# Patient Record
Sex: FEMALE | Race: WHITE | NOT HISPANIC OR LATINO | Employment: OTHER | URBAN - METROPOLITAN AREA
[De-identification: names, ages, dates, MRNs, and addresses within clinical notes are randomized per-mention and may not be internally consistent; named-entity substitution may affect disease eponyms.]

---

## 2017-09-01 ENCOUNTER — TELEPHONE (OUTPATIENT)
Dept: MEDICAL GROUP | Age: 67
End: 2017-09-01

## 2017-09-01 NOTE — TELEPHONE ENCOUNTER
ESTABLISHED PATIENT PRE-VISIT PLANNING     Note: Patient will not be contacted if there is no indication to call.     1.  Reviewed notes from the last few office visits within the medical group: Yes    2.  If any orders were placed at last visit or intended to be done for this visit (i.e. 6 mos follow-up), do we have Results/Consult Notes?        •  Labs - Labs were not ordered at last office visit.       •  Imaging - Imaging was not ordered at last office visit.       •  Referrals - No referrals were ordered at last office visit.    3. Is this appointment scheduled as a Hospital Follow-Up? No    4.  Immunizations were updated in [facility] using WebIZ?: Yes       •  Web Iz Recommendations: FLU, HEPATITIS A , HEPATITIS B, PREVNAR (PCV13)  and TDAP    5.  Patient is due for the following Health Maintenance Topics:   Health Maintenance Due   Topic Date Due   • IMM DTaP/Tdap/Td Vaccine (1 - Tdap) 08/27/1971   • PAP SMEAR  08/27/1973   • COLONOSCOPY  08/27/2002   • IMM ZOSTER VACCINE  08/27/2012   • MAMMOGRAM  08/13/2016   • IMM PNEUMOCOCCAL 65+ (ADULT) LOW/MEDIUM RISK SERIES (1 of 2 - PCV13) 08/27/2017   • IMM INFLUENZA (1) 09/01/2017       - Patient has completed ZOSTAVAX (Shingles) Immunization(s) per WebIZ. Chart has been updated.      6.  Patient was NOT informed to arrive 15 min prior to their scheduled appointment and bring in their medication bottles.

## 2017-09-04 PROBLEM — E03.4 HYPOTHYROIDISM DUE TO ACQUIRED ATROPHY OF THYROID: Status: ACTIVE | Noted: 2017-09-04

## 2017-09-04 RX ORDER — THYROID 60 MG
30 TABLET ORAL
Refills: 1 | COMMUNITY
Start: 2017-08-07 | End: 2017-11-27

## 2017-09-05 ENCOUNTER — OFFICE VISIT (OUTPATIENT)
Dept: MEDICAL GROUP | Age: 67
End: 2017-09-05
Payer: COMMERCIAL

## 2017-09-05 VITALS
WEIGHT: 126.6 LBS | TEMPERATURE: 97.9 F | DIASTOLIC BLOOD PRESSURE: 68 MMHG | SYSTOLIC BLOOD PRESSURE: 102 MMHG | HEIGHT: 64 IN | BODY MASS INDEX: 21.61 KG/M2 | HEART RATE: 75 BPM | OXYGEN SATURATION: 98 %

## 2017-09-05 DIAGNOSIS — Z12.31 VISIT FOR SCREENING MAMMOGRAM: ICD-10-CM

## 2017-09-05 DIAGNOSIS — E55.9 VITAMIN D INSUFFICIENCY: ICD-10-CM

## 2017-09-05 DIAGNOSIS — R73.01 IFG (IMPAIRED FASTING GLUCOSE): ICD-10-CM

## 2017-09-05 DIAGNOSIS — Z12.11 SCREENING FOR COLON CANCER: ICD-10-CM

## 2017-09-05 DIAGNOSIS — E78.5 DYSLIPIDEMIA: Chronic | ICD-10-CM

## 2017-09-05 DIAGNOSIS — E03.4 HYPOTHYROIDISM DUE TO ACQUIRED ATROPHY OF THYROID: ICD-10-CM

## 2017-09-05 DIAGNOSIS — F51.01 PRIMARY INSOMNIA: ICD-10-CM

## 2017-09-05 PROCEDURE — 99204 OFFICE O/P NEW MOD 45 MIN: CPT | Performed by: INTERNAL MEDICINE

## 2017-09-05 RX ORDER — TRAZODONE HYDROCHLORIDE 50 MG/1
25 TABLET ORAL
Qty: 90 TAB | Refills: 3 | Status: SHIPPED | OUTPATIENT
Start: 2017-09-05 | End: 2019-02-28

## 2017-09-05 RX ORDER — LORAZEPAM 1 MG/1
0.5 TABLET ORAL EVERY 4 HOURS PRN
COMMUNITY
End: 2017-09-05

## 2017-09-05 RX ORDER — LEVOTHYROXINE SODIUM 0.05 MG/1
50 TABLET ORAL
Qty: 90 TAB | Refills: 3 | Status: SHIPPED | OUTPATIENT
Start: 2017-09-05 | End: 2018-06-04 | Stop reason: SDUPTHER

## 2017-09-05 ASSESSMENT — PATIENT HEALTH QUESTIONNAIRE - PHQ9: CLINICAL INTERPRETATION OF PHQ2 SCORE: 0

## 2017-09-05 ASSESSMENT — PAIN SCALES - GENERAL: PAINLEVEL: NO PAIN

## 2017-09-05 NOTE — LETTER
[employer]  Penny Jensen M.D.  [address]  [place] [address]  Fax: [phone]   Authorization for Release/Disclosure of   Protected Health Information   Name: ЕЛЕНА MALIK : 1950 SSN: [id number]   Address: [address] [place] [address] Phone:    [phone] (home)    I authorize the entity listed below to release/disclose the PHI below to:   [facility]/Penny Jensen M.D. and Penny Jensen M.D.   Provider or Entity Name:  [facility] Restrepo[facility]   Address   City, State, [facility]   Phone:      Fax:     Reason for request: continuity of care   Information to be released:    [  ] LAST COLONOSCOPY,  including any PATH REPORT and follow-up  [  ] LAST FIT/COLOGUARD RESULT [  ] LAST DEXA  [  ] LAST MAMMOGRAM  [ xxxx ] LAST PAP  [  ] LAST LABS [  ] RETINA EXAM REPORT  [  ] IMMUNIZATION RECORDS  [  ] Release all info      [  ] Check here and initial the line next to each item to release ALL health information INCLUDING  _____ Care and treatment for drug and / or alcohol abuse  _____ HIV testing, infection status, or AIDS  _____ Genetic Testing    DATES OF SERVICE OR TIME PERIOD TO BE DISCLOSED: _____________  I understand and acknowledge that:  * This Authorization may be revoked at any time by you in writing, except if your health information has already been used or disclosed.  * Your health information that will be used or disclosed as a result of you signing this authorization could be re-disclosed by the recipient. If this occurs, your re-disclosed health information may no longer be protected by State or Federal laws.  * You may refuse to sign this Authorization. Your refusal will not affect your ability to obtain treatment.  * This Authorization becomes effective upon signing and will  on (date) __________.      If no date is indicated, this Authorization will  one (1) year from the signature date.    Name: Елена Malik    Signature:   Date:     2017       PLEASE  FAX REQUESTED RECORDS BACK TO: [phone]

## 2017-09-06 NOTE — ASSESSMENT & PLAN NOTE
Patient reported that she has significant family history of diabetes. She never has diagnosed with diabetes but she has borderline high sugar in the past. She will try to control her sugar with diet and exercise. She requested to check her A1c.

## 2017-09-06 NOTE — ASSESSMENT & PLAN NOTE
Patient stated that she has difficult to fall asleep sometimes. She was treated with Ativan as needed for sleep. She only took Ativan once a week or twice a week the most. We discussed the risks of chronic use of Ativan and discussed to switch to trazodone. Patient stated that she has not taken Ativan for 4-6 week and she does not have any withdrawal symptoms. She agreed to try trazodone.

## 2017-09-06 NOTE — ASSESSMENT & PLAN NOTE
Patient has mixed hyperlipidemia. She has never treated with medication. She tries to control her cholesterol with diet and exercise.

## 2017-09-06 NOTE — ASSESSMENT & PLAN NOTE
Patient reported that she has low vitamin D in the past and was treated with vitamin D prescription. She requests to retest vitamin D level.

## 2017-09-06 NOTE — PROGRESS NOTES
Елена San is a 67 y.o. female here to establish care and the evaluation and management of:      HPI:    Vitamin D insufficiency  Patient reported that she has low vitamin D in the past and was treated with vitamin D prescription. She requests to retest vitamin D level.    IFG (impaired fasting glucose)  Patient reported that she has significant family history of diabetes. She never has diagnosed with diabetes but she has borderline high sugar in the past. She will try to control her sugar with diet and exercise. She requested to check her A1c.    Hypothyroidism due to acquired atrophy of thyroid  Patient stated that she was diagnosed with hypothyroid from the blood test. She is taking Huxley thyroid 60 mg daily. The dose of armour thyroid was increased from 30 mg to 60 mg as she has high TSH as 7. After increasing the dose, TSH decreased to 3.5. She denied side effects from taking armour thyroid. Given her age and the risk of taking armour thyroid hormone supplement, we discussed to switch amour thyroid to levothyroxine. Patient agreed with plan.    Dyslipidemia  Patient has mixed hyperlipidemia. She has never treated with medication. She tries to control her cholesterol with diet and exercise.    Primary insomnia  Patient stated that she has difficult to fall asleep sometimes. She was treated with Ativan as needed for sleep. She only took Ativan once a week or twice a week the most. We discussed the risks of chronic use of Ativan and discussed to switch to trazodone. Patient stated that she has not taken Ativan for 4-6 week and she does not have any withdrawal symptoms. She agreed to try trazodone.    Current medicines (including changes today)  Current Outpatient Prescriptions   Medication Sig Dispense Refill   • trazodone (DESYREL) 50 MG Tab Take 0.5 Tabs by mouth at bedtime as needed for Sleep. 90 Tab 3   • levothyroxine (SYNTHROID) 50 MCG Tab Take 1 Tab by mouth Every morning on an empty stomach. 90 Tab 3   •  AKOSUA THYROID 60 MG Tab Take 30 mg by mouth every day.  1     No current facility-administered medications for this visit.      She  has a past medical history of Cardiac murmur (2009); Dyslipidemia (2009); Neoplasm of unspecified nature of bone, soft tissue, and skin (2009); Overactive bladder (2009); Skin lesion (2009); and Thyroid disease.  She  has a past surgical history that includes imary c section.  Social History   Substance Use Topics   • Smoking status: Former Smoker     Packs/day: 1.00     Quit date: 1972   • Smokeless tobacco: Never Used      Comment: only smoked 3 years   • Alcohol use 1.8 oz/week     3 Glasses of wine per week     Social History     Social History Narrative   • No narrative on file     Family History   Problem Relation Age of Onset   • Hypertension Mother    • Diabetes Mother    • Hyperlipidemia Mother    • Cancer Father      throat cancer   • Diabetes Brother    • Hypertension Brother    • Hyperlipidemia Brother    • Diabetes Brother    • Hypertension Brother    • Hyperlipidemia Brother      Family Status   Relation Status   • Mother Alive   • Father    • Brother Alive   • Brother Alive     Health Maintenance Topics with due status: Overdue       Topic Date Due    IMM DTaP/Tdap/Td Vaccine 1971    PAP SMEAR 1973    COLONOSCOPY 2002    MAMMOGRAM 2016    IMM PNEUMOCOCCAL 65+ (ADULT) LOW/MEDIUM RISK SERIES 2017    IMM INFLUENZA 2017         ROS    Gen.: Denied weight change, appetite change, fatigue.  ENT: Denied sinus tenderness, nasal congestion, runny nose, or sore throat  CVS: Denied chest pain, palpitations, legs swelling.  Respiratory: Denied cough, shortness of breath, wheezing.  GI: Denied abdominal pain, constipation or diarrhea.  Endocrine: Denied temperature intolerance, increased frequency of urination, polyphagia or polydipsia.  Musculoskeletal: Denied back pain or joint pain.    All other systems  "reviewed and are negative     Objective:     Blood pressure 102/68, pulse 75, temperature 36.6 °C (97.9 °F), height 1.613 m (5' 3.5\"), weight 57.4 kg (126 lb 9.6 oz), SpO2 98 %, not currently breastfeeding. Body mass index is 22.07 kg/m².  Physical Exam:    Constitutional: Well nourished and Well developed, Alert, no distress.  Skin: Warm, dry, good turgor, no rashes in visible areas.  Eye: Equal, round and reactive, conjunctiva clear, lids normal.  ENMT: Lips without lesions, good dentition, oropharynx clear.  Neck: Trachea midline, no masses, no thyromegaly. No cervical or supraclavicular lymphadenopathy.  Respiratory: Unlabored respiratory effort, lungs clear to auscultation, no wheezes, no ronchi.  Cardiovascular: Normal S1, S2, no murmur, no edema.   Abdomen: Soft, non distended, non-tender, no masses, no hepatosplenomegaly. Bowel sound normal.  Extremities: No edema, no clubbing, no cyanosis.  Psych: Alert and oriented x3, normal affect and mood.        Assessment and Plan:   The following treatment plan was discussed       1. Hypothyroidism due to acquired atrophy of thyroid  - Discussed to switch amour thyroid to levothyroxine. Patient will cut down amour thyroid from 60 mg daily to 30 mg daily for one week and then cut down 30 mg every other day for one week and then cut down 30 mg twice a week and then stop.  - She was started taking levothyroxine 50 µg every morning on an empty stomach.  - Recheck lab in 6-8 week period  - CBC WITH DIFFERENTIAL; Future  - TSH; Future  - FREE THYROXINE; Future  - levothyroxine (SYNTHROID) 50 MCG Tab; Take 1 Tab by mouth Every morning on an empty stomach.  Dispense: 90 Tab; Refill: 3    2. Dyslipidemia  - Not on medication. Continue to control with diet and exercise.  - Advised to eat low fat, low carbohydrate and high fiber diet as well as do cardio physical exercise regularly.   - COMP METABOLIC PANEL; Future  - LIPID PROFILE; Future    3. Vitamin D insufficiency  - " Advised to take vitamin D 2000 units daily. Recheck lab in 8 weeks.  - VITAMIN D,25 HYDROXY; Future    4. IFG (impaired fasting glucose)  - Advised to eat low fat, low carbohydrate and high fiber diet as well as do cardio physical exercise regularly.   - COMP METABOLIC PANEL; Future  - HEMOGLOBIN A1C; Future    5. Primary insomnia  - Discussed sleep hygiene. Go to bed and wake up at consistent times whether work/school day or not. Keep room dark, quiet, and comfortable. Don't nap in late afternoon and don't nap more than an hour. Increase exposure to sunlight during awake times and avoid bright lights before going to sleep. Avoid stimulant or caffeine use more than 4 hours after wake time.   - She will try trazodone 25 mg daily at bedtime when necessary. Reviewed the potential side effect of trazodone with patient.  - trazodone (DESYREL) 50 MG Tab; Take 0.5 Tabs by mouth at bedtime as needed for Sleep.  Dispense: 90 Tab; Refill: 3    6. Visit for screening mammogram  - Order screening mammogram.  - MA-SCREEN MAMMO W/CAD-BILAT; Future    7. Screening for colon cancer  - Patient has colonoscopy with [facility] 10 years ago. She is to repeat colonoscopy. She requests to refer her to [facility].  - REFERRAL TO GASTROENTEROLOGY    8. Health maintenance  - Patient has appointment with gynecologist, Dr. Gant for pelvic exam and Pap smear. She wants to postpone flu vaccine, pneumonia vaccine and tetanus vaccine to next few weeks.    Records requested.  Followup: Return in about 10 weeks (around 11/14/2017), or if symptoms worsen or fail to improve, for hypothyroid, hyperlipidemia, vitamin D insufficiency, insomnia, lab review. sooner should new symptoms or problems arise.      Please note that this dictation was created using voice recognition software. I have made every reasonable attempt to correct obvious errors, but I expect that there may have unintended errors in text, spelling, punctuation, or grammar  that I did not discover.

## 2017-09-15 ENCOUNTER — APPOINTMENT (OUTPATIENT)
Dept: MEDICAL GROUP | Age: 67
End: 2017-09-15
Payer: COMMERCIAL

## 2017-09-28 ENCOUNTER — HOSPITAL ENCOUNTER (OUTPATIENT)
Dept: RADIOLOGY | Facility: MEDICAL CENTER | Age: 67
End: 2017-09-28
Attending: INTERNAL MEDICINE
Payer: COMMERCIAL

## 2017-09-28 DIAGNOSIS — Z12.31 VISIT FOR SCREENING MAMMOGRAM: ICD-10-CM

## 2017-09-28 PROCEDURE — G0202 SCR MAMMO BI INCL CAD: HCPCS

## 2017-11-15 LAB
25(OH)D3+25(OH)D2 SERPL-MCNC: 60.7 NG/ML (ref 30–100)
ALBUMIN SERPL-MCNC: 4.5 G/DL (ref 3.6–4.8)
ALBUMIN/GLOB SERPL: 1.7 {RATIO} (ref 1.2–2.2)
ALP SERPL-CCNC: 44 IU/L (ref 39–117)
ALT SERPL-CCNC: 17 IU/L (ref 0–32)
AST SERPL-CCNC: 21 IU/L (ref 0–40)
BASOPHILS # BLD AUTO: 0 X10E3/UL (ref 0–0.2)
BASOPHILS NFR BLD AUTO: 1 %
BILIRUB SERPL-MCNC: 0.6 MG/DL (ref 0–1.2)
BUN SERPL-MCNC: 19 MG/DL (ref 8–27)
BUN/CREAT SERPL: 21 (ref 12–28)
CALCIUM SERPL-MCNC: 9.5 MG/DL (ref 8.7–10.3)
CHLORIDE SERPL-SCNC: 98 MMOL/L (ref 96–106)
CHOLEST SERPL-MCNC: 212 MG/DL (ref 100–199)
CO2 SERPL-SCNC: 25 MMOL/L (ref 18–29)
COMMENT 011824: ABNORMAL
CREAT SERPL-MCNC: 0.91 MG/DL (ref 0.57–1)
EOSINOPHIL # BLD AUTO: 0.3 X10E3/UL (ref 0–0.4)
EOSINOPHIL NFR BLD AUTO: 7 %
ERYTHROCYTE [DISTWIDTH] IN BLOOD BY AUTOMATED COUNT: 12.9 % (ref 12.3–15.4)
GFR SERPLBLD CREATININE-BSD FMLA CKD-EPI: 66 ML/MIN/1.73
GFR SERPLBLD CREATININE-BSD FMLA CKD-EPI: 77 ML/MIN/1.73
GLOBULIN SER CALC-MCNC: 2.6 G/DL (ref 1.5–4.5)
GLUCOSE SERPL-MCNC: 89 MG/DL (ref 65–99)
HBA1C MFR BLD: 5.2 % (ref 4.8–5.6)
HCT VFR BLD AUTO: 45.6 % (ref 34–46.6)
HDLC SERPL-MCNC: 77 MG/DL
HGB BLD-MCNC: 15.3 G/DL (ref 11.1–15.9)
IMM GRANULOCYTES # BLD: 0 X10E3/UL (ref 0–0.1)
IMM GRANULOCYTES NFR BLD: 0 %
IMMATURE CELLS  115398: NORMAL
LDLC SERPL CALC-MCNC: 120 MG/DL (ref 0–99)
LYMPHOCYTES # BLD AUTO: 1.7 X10E3/UL (ref 0.7–3.1)
LYMPHOCYTES NFR BLD AUTO: 39 %
MCH RBC QN AUTO: 32 PG (ref 26.6–33)
MCHC RBC AUTO-ENTMCNC: 33.6 G/DL (ref 31.5–35.7)
MCV RBC AUTO: 95 FL (ref 79–97)
MONOCYTES # BLD AUTO: 0.3 X10E3/UL (ref 0.1–0.9)
MONOCYTES NFR BLD AUTO: 8 %
MORPHOLOGY BLD-IMP: NORMAL
NEUTROPHILS # BLD AUTO: 2 X10E3/UL (ref 1.4–7)
NEUTROPHILS NFR BLD AUTO: 45 %
NRBC BLD AUTO-RTO: NORMAL %
PLATELET # BLD AUTO: 336 X10E3/UL (ref 150–379)
POTASSIUM SERPL-SCNC: 4.2 MMOL/L (ref 3.5–5.2)
PROT SERPL-MCNC: 7.1 G/DL (ref 6–8.5)
RBC # BLD AUTO: 4.78 X10E6/UL (ref 3.77–5.28)
SODIUM SERPL-SCNC: 140 MMOL/L (ref 134–144)
T4 FREE SERPL-MCNC: 1.28 NG/DL (ref 0.82–1.77)
TRIGL SERPL-MCNC: 74 MG/DL (ref 0–149)
TSH SERPL DL<=0.005 MIU/L-ACNC: 3.79 UIU/ML (ref 0.45–4.5)
VLDLC SERPL CALC-MCNC: 15 MG/DL (ref 5–40)
WBC # BLD AUTO: 4.3 X10E3/UL (ref 3.4–10.8)

## 2017-11-26 NOTE — ASSESSMENT & PLAN NOTE
Patient used to take ativan as needed for insomnia. We discussed not to take benzodiazepine chronically due to dependency and other potential side effects. She agrees to try trazodone in previous visit on 9/5/17. She is taking trazodone 25-50 mg qhs prn. She denies side effects from taking trazodone and she is doing well with trazodone.

## 2017-11-26 NOTE — ASSESSMENT & PLAN NOTE
Patient used to take Amour thyroid 60 mg daily. We discussed to wean down Amour thyroid and stopped it as well as advised her to start taking levothyroxine 50 mcg daily every morning with empty stomach in last visit on 9/5/17. Her thyroid function test was within normal with treatment of levothyroxine 50 µg every morning for 6 weeks. She already stopped taking Amour thyroid. We discussed to continue levothyroxine alone. She agreed with the plan.    Thyroid function tests are within normal.    Results for EMILI MALIK (MRN [id number]) as of 11/26/2017 15:02   Ref. Range 11/14/2017 08:42   TSH Latest Ref Range: 0.450 - 4.500 uIU/mL 3.790   Free T-4 Latest Ref Range: 0.82 - 1.77 ng/dL 1.28

## 2017-11-26 NOTE — ASSESSMENT & PLAN NOTE
Her vitamin D level was adequate currently. She reported viatmin D insufficiency in the past and was treated with supplement in the past. She is taking over-the-counter vitamin D 2000 units daily.    Results for EMILI MALIK (MRN [id number]) as of 11/26/2017 15:02   Ref. Range 6/11/2009 07:55 11/14/2017 08:42   25-Hydroxy   Vitamin D 25 Latest Ref Range: 30.0 - 100.0 ng/mL 30.5 (L) 60.7

## 2017-11-26 NOTE — ASSESSMENT & PLAN NOTE
Her fasting blood sugar and A1C improve with diet. A1C 5.2 on 11/14/17. She reported family hx of diabetes and personal history of borderline high fasting blood sugar.

## 2017-11-26 NOTE — ASSESSMENT & PLAN NOTE
Patient attempts to control her cholesterol with diet and exercise.   Cholesterol is not well controlled yet.  ASCVD score 3.3%  We discussed the risks and benefits of statin treatment. Patient still wants to control cholesterol with diet and physical exercise.    Results for EMILI MALIK (MRN [id number]) as of 11/26/2017 15:02   Ref. Range 6/11/2009 07:55 11/14/2017 08:42   Cholesterol,Tot Latest Ref Range: 100 - 199 mg/dL 212 (H) 212 (H)   Triglycerides Latest Ref Range: 0 - 149 mg/dL 166 (H) 74   HDL Latest Ref Range: >39 mg/dL 51 77   LDL Latest Ref Range: 0 - 99 mg/dL 128 (H) 120 (H)

## 2017-11-27 ENCOUNTER — OFFICE VISIT (OUTPATIENT)
Dept: MEDICAL GROUP | Age: 67
End: 2017-11-27
Payer: COMMERCIAL

## 2017-11-27 VITALS
DIASTOLIC BLOOD PRESSURE: 60 MMHG | TEMPERATURE: 97.5 F | HEIGHT: 64 IN | HEART RATE: 87 BPM | OXYGEN SATURATION: 97 % | WEIGHT: 128.4 LBS | BODY MASS INDEX: 21.92 KG/M2 | SYSTOLIC BLOOD PRESSURE: 104 MMHG

## 2017-11-27 DIAGNOSIS — E78.5 DYSLIPIDEMIA: Chronic | ICD-10-CM

## 2017-11-27 DIAGNOSIS — Z23 NEED FOR DIPHTHERIA-TETANUS-PERTUSSIS (TDAP) VACCINE: ICD-10-CM

## 2017-11-27 DIAGNOSIS — F51.01 PRIMARY INSOMNIA: ICD-10-CM

## 2017-11-27 DIAGNOSIS — E03.4 HYPOTHYROIDISM DUE TO ACQUIRED ATROPHY OF THYROID: ICD-10-CM

## 2017-11-27 DIAGNOSIS — R73.01 IFG (IMPAIRED FASTING GLUCOSE): ICD-10-CM

## 2017-11-27 DIAGNOSIS — E55.9 VITAMIN D INSUFFICIENCY: ICD-10-CM

## 2017-11-27 PROCEDURE — 90715 TDAP VACCINE 7 YRS/> IM: CPT | Performed by: INTERNAL MEDICINE

## 2017-11-27 PROCEDURE — 99215 OFFICE O/P EST HI 40 MIN: CPT | Mod: 25 | Performed by: INTERNAL MEDICINE

## 2017-11-27 PROCEDURE — 90471 IMMUNIZATION ADMIN: CPT | Performed by: INTERNAL MEDICINE

## 2017-11-27 RX ORDER — MULTIVIT-MIN/IRON/FOLIC ACID/K 18-600-40
CAPSULE ORAL
COMMUNITY

## 2017-11-27 ASSESSMENT — PAIN SCALES - GENERAL: PAINLEVEL: NO PAIN

## 2017-11-28 NOTE — PROGRESS NOTES
Subjective:   Елена Malik is a 67 y.o. female here today for evaluation and management of:      Dyslipidemia  Patient attempts to control her cholesterol with diet and exercise.   Cholesterol is not well controlled yet.  ASCVD score 3.3%  We discussed the risks and benefits of statin treatment. Patient still wants to control cholesterol with diet and physical exercise.    Results for ЕЛЕНА MALIK (MRN [id number]) as of 11/26/2017 15:02   Ref. Range 6/11/2009 07:55 11/14/2017 08:42   Cholesterol,Tot Latest Ref Range: 100 - 199 mg/dL 212 (H) 212 (H)   Triglycerides Latest Ref Range: 0 - 149 mg/dL 166 (H) 74   HDL Latest Ref Range: >39 mg/dL 51 77   LDL Latest Ref Range: 0 - 99 mg/dL 128 (H) 120 (H)       Hypothyroidism due to acquired atrophy of thyroid  Patient used to take Amour thyroid 60 mg daily. We discussed to wean down Amour thyroid and stopped it as well as advised her to start taking levothyroxine 50 mcg daily every morning with empty stomach in last visit on 9/5/17. Her thyroid function test was within normal with treatment of levothyroxine 50 µg every morning for 6 weeks. She already stopped taking Amour thyroid. We discussed to continue levothyroxine alone. She agreed with the plan.    Thyroid function tests are within normal.    Results for ЕЛЕНА MALIK (MRN [id number]) as of 11/26/2017 15:02   Ref. Range 11/14/2017 08:42   TSH Latest Ref Range: 0.450 - 4.500 uIU/mL 3.790   Free T-4 Latest Ref Range: 0.82 - 1.77 ng/dL 1.28       Vitamin D insufficiency  Her vitamin D level was adequate currently. She reported viatmin D insufficiency in the past and was treated with supplement in the past. She is taking over-the-counter vitamin D 2000 units daily.    Results for ЕЛЕНА MALIK (MRN [id number]) as of 11/26/2017 15:02   Ref. Range 6/11/2009 07:55 11/14/2017 08:42   25-Hydroxy   Vitamin D 25 Latest Ref Range: 30.0 - 100.0 ng/mL 30.5 (L) 60.7       IFG (impaired fasting glucose)  Her fasting blood sugar and A1C improve  "with diet. A1C 5.2 on 11/14/17. She reported family hx of diabetes and personal history of borderline high fasting blood sugar.     Primary insomnia  Patient used to take ativan as needed for insomnia. We discussed not to take benzodiazepine chronically due to dependency and other potential side effects. She agrees to try trazodone in previous visit on 9/5/17. She is taking trazodone 25-50 mg qhs prn. She denies side effects from taking trazodone and she is doing well with trazodone.         Current medicines (including changes today)  Current Outpatient Prescriptions   Medication Sig Dispense Refill   • Cholecalciferol (VITAMIN D) 2000 units Cap Take  by mouth.     • trazodone (DESYREL) 50 MG Tab Take 0.5 Tabs by mouth at bedtime as needed for Sleep. 90 Tab 3   • levothyroxine (SYNTHROID) 50 MCG Tab Take 1 Tab by mouth Every morning on an empty stomach. 90 Tab 3     No current facility-administered medications for this visit.      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    ROS   No chest pain, no shortness of breath, no abdominal pain       Objective:     Blood pressure 104/60, pulse 87, temperature 36.4 °C (97.5 °F), height 1.613 m (5' 3.5\"), weight 58.2 kg (128 lb 6.4 oz), SpO2 97 %, not currently breastfeeding. Body mass index is 22.39 kg/m².   Physical Exam:  General: Alert, oriented and no acute distress.  Eye contact is good, speech goal directed, affect calm  HEENT: conjunctiva non-injected, sclera non-icteric.  Oral mucous membranes pink and moist with no lesions.  Pinna normal.   Lungs: Normal respiratory effort, clear to auscultation bilaterally with good excursion.  CV: regular rate and rhythm. No murmurs.   Abdomen: soft, non distended, nontender, Bowel sound normal.  Ext: no edema, color normal, vascularity " normal, temperature normal        Assessment and Plan:   The following treatment plan was discussed     1. Dyslipidemia  - Patient wants to continue to control cholesterol with diet and exercise.  - Advised to eat low fat, low carbohydrate and high fiber diet as well as do cardio physical exercise regularly.   - Recheck lab in 6 months.  - COMP METABOLIC PANEL; Future  - LIPID PROFILE; Future    2. Hypothyroidism due to acquired atrophy of thyroid  - Well-controlled. Continue current regimens. Recheck lab 1-2 weeks before next follow up visit.  - TSH; Future  - FREE THYROXINE; Future    3. Vitamin D insufficiency  - Well-controlled. Continue current regimens. Recheck lab 1-2 weeks before next follow up visit.  - Cholecalciferol (VITAMIN D) 2000 units Cap; Take  by mouth.    4. IFG (impaired fasting glucose)  - Discussed to eat low-carb diet and regular physical exercise.    5. Primary insomnia  - Discussed sleep hygiene. Go to bed and wake up at consistent times whether work/school day or not. Keep room dark, quiet, and comfortable. Don't nap in late afternoon and don't nap more than an hour. Increase exposure to sunlight during awake times and avoid bright lights before going to sleep. Avoid stimulant or caffeine use more than 4 hours after wake time.   - She will take trazodone 25 milligrams daily at bedtime when necessary only.  - Review potential side effects of trazodone with patient. She is advised to stop taking it if she has side effects from taking trazodone.    6. Need for diphtheria-tetanus-pertussis (Tdap) vaccine  - Tdap vaccine was given today after reviewing risks and benefits as well as side effects of vaccine.  - TDAP VACCINE =>6YO IM    7. Health Maintenance   - Patient received flu vaccine and Prevnar 13 and September 2017. She has normal colonoscopy on 9/26/17.    Face-to-face time spent 40 minutes with patient and more than half of that time spent for counseling and cooperating of care for  medical problems listed above.       Followup: Return in about 6 months (around 5/27/2018), or if symptoms worsen or fail to improve, for hyperlipidemia, hypothyroid, vitamin D insufficiency, insomnia, lab review.      Please note that this dictation was created using voice recognition software. I have made every reasonable attempt to correct obvious errors, but I expect that there may have unintended errors in text, spelling, punctuation, or grammar that I did not discover.

## 2018-05-16 LAB
ALBUMIN SERPL-MCNC: 4.3 G/DL (ref 3.6–4.8)
ALBUMIN/GLOB SERPL: 1.8 {RATIO} (ref 1.2–2.2)
ALP SERPL-CCNC: 41 IU/L (ref 39–117)
ALT SERPL-CCNC: 16 IU/L (ref 0–32)
AST SERPL-CCNC: 25 IU/L (ref 0–40)
BILIRUB SERPL-MCNC: 0.3 MG/DL (ref 0–1.2)
BUN SERPL-MCNC: 13 MG/DL (ref 8–27)
BUN/CREAT SERPL: 14 (ref 12–28)
CALCIUM SERPL-MCNC: 9.6 MG/DL (ref 8.7–10.3)
CHLORIDE SERPL-SCNC: 102 MMOL/L (ref 96–106)
CHOLEST SERPL-MCNC: 207 MG/DL (ref 100–199)
CO2 SERPL-SCNC: 25 MMOL/L (ref 18–29)
CREAT SERPL-MCNC: 0.93 MG/DL (ref 0.57–1)
GFR SERPLBLD CREATININE-BSD FMLA CKD-EPI: 65 ML/MIN/1.73
GFR SERPLBLD CREATININE-BSD FMLA CKD-EPI: 75 ML/MIN/1.73
GLOBULIN SER CALC-MCNC: 2.4 G/DL (ref 1.5–4.5)
GLUCOSE SERPL-MCNC: 91 MG/DL (ref 65–99)
HDLC SERPL-MCNC: 78 MG/DL
LABORATORY COMMENT REPORT: ABNORMAL
LDLC SERPL CALC-MCNC: 116 MG/DL (ref 0–99)
POTASSIUM SERPL-SCNC: 4.6 MMOL/L (ref 3.5–5.2)
PROT SERPL-MCNC: 6.7 G/DL (ref 6–8.5)
SODIUM SERPL-SCNC: 141 MMOL/L (ref 134–144)
T4 FREE SERPL-MCNC: 1.11 NG/DL (ref 0.82–1.77)
TRIGL SERPL-MCNC: 64 MG/DL (ref 0–149)
TSH SERPL DL<=0.005 MIU/L-ACNC: 3.26 UIU/ML (ref 0.45–4.5)
VLDLC SERPL CALC-MCNC: 13 MG/DL (ref 5–40)

## 2018-06-02 NOTE — ASSESSMENT & PLAN NOTE
Patient is taking vitamin D 2000 units daily.  Vitamin D level improved with current supplements.  Last vitamin D level was 60.7 on 11/14/17.    Results for EMILI MALIK (MRN [id number]) as of 6/2/2018 15:09   Ref. Range 6/11/2009 07:55 11/14/2017 08:42   25-Hydroxy   Vitamin D 25 Latest Ref Range: 30.0 - 100.0 ng/mL 30.5 (L) 60.7

## 2018-06-02 NOTE — ASSESSMENT & PLAN NOTE
Patient is taking trazodone 25-50 mg nightly as needed for sleep.  She denies side effects from taking trazodone.  She wants to continue trazodone for as needed to help her sleep.

## 2018-06-02 NOTE — ASSESSMENT & PLAN NOTE
Patient is taking levothyroxine 50 mcg every morning on empty stomach.  She already stopped taking Amour thyroid since September 2017.  She is in euthyroid state.  Her thyroid function test is within normal., However, patient reported that she feels difficult to decrease her body weight with levothyroxine compared to when she was taking Amour thyoid. She also noticed feeling cold intolerance more. She wants to try to increasing the dose of levothyroxine, but she also does not want to over treated or having side effects from taking too much thyroid medicine.    I discussed with her that I prefer her to continue levothyroxine with the same dose 50 µg every morning on an empty stomach, but if she insists to increase the dose, I will recommend prescription to try taking additional half dose on every Sunday and see that she have any side effects. Patient agreed with the plan.    Results for EMILI MALIK (MRN [id number]) as of 6/2/2018 15:09   Ref. Range 11/14/2017 08:42 5/15/2018 08:56   TSH Latest Ref Range: 0.450 - 4.500 uIU/mL 3.790 3.260   Free T-4 Latest Ref Range: 0.82 - 1.77 ng/dL 1.28 1.11

## 2018-06-02 NOTE — ASSESSMENT & PLAN NOTE
Patient tries to control her blood sugar with diet and exercise.  She has history of impaired fasting blood sugar which is improved with diet.  She reported family history of diabetes.  Patient denied polyuria or polydipsia.

## 2018-06-02 NOTE — ASSESSMENT & PLAN NOTE
Patient tries to control her cholesterol with diet and exercise.  She does not want to take statin.  I reviewed the risks and benefits of starting treatment and primary prevention of cardiovascular disease from statin treatment.  Her cholesterol levels are slightly improved with diet and exercise.  I reviewed her blood test with her in clinic today.    Results for EMILI MALIK (MRN [id number]) as of 6/2/2018 15:09   Ref. Range 6/11/2009 07:55 11/14/2017 08:42 5/15/2018 08:56   Cholesterol,Tot Latest Ref Range: 100 - 199 mg/dL 212 (H) 212 (H) 207 (H)   Triglycerides Latest Ref Range: 0 - 149 mg/dL 166 (H) 74 64   HDL Latest Ref Range: >39 mg/dL 51 77 78   LDL Latest Ref Range: 0 - 99 mg/dL 128 (H) 120 (H) 116 (H)

## 2018-06-04 ENCOUNTER — OFFICE VISIT (OUTPATIENT)
Dept: MEDICAL GROUP | Age: 68
End: 2018-06-04
Payer: COMMERCIAL

## 2018-06-04 VITALS
OXYGEN SATURATION: 93 % | HEART RATE: 69 BPM | RESPIRATION RATE: 12 BRPM | TEMPERATURE: 99.2 F | SYSTOLIC BLOOD PRESSURE: 112 MMHG | DIASTOLIC BLOOD PRESSURE: 68 MMHG | WEIGHT: 125.6 LBS | BODY MASS INDEX: 21.44 KG/M2 | HEIGHT: 64 IN

## 2018-06-04 DIAGNOSIS — R73.01 IFG (IMPAIRED FASTING GLUCOSE): ICD-10-CM

## 2018-06-04 DIAGNOSIS — E55.9 VITAMIN D INSUFFICIENCY: ICD-10-CM

## 2018-06-04 DIAGNOSIS — F51.01 PRIMARY INSOMNIA: ICD-10-CM

## 2018-06-04 DIAGNOSIS — E03.4 HYPOTHYROIDISM DUE TO ACQUIRED ATROPHY OF THYROID: ICD-10-CM

## 2018-06-04 DIAGNOSIS — E78.5 DYSLIPIDEMIA: Chronic | ICD-10-CM

## 2018-06-04 PROCEDURE — 99214 OFFICE O/P EST MOD 30 MIN: CPT | Performed by: INTERNAL MEDICINE

## 2018-06-04 RX ORDER — LEVOTHYROXINE SODIUM 0.05 MG/1
50 TABLET ORAL
Qty: 90 TAB | Refills: 3 | Status: SHIPPED | OUTPATIENT
Start: 2018-06-04 | End: 2019-06-26 | Stop reason: SDUPTHER

## 2018-06-04 NOTE — PROGRESS NOTES
Subjective:   Елена Malik is a 67 y.o. female here today for evaluation and management of:      Hypothyroidism due to acquired atrophy of thyroid  Patient is taking levothyroxine 50 mcg every morning on empty stomach.  She already stopped taking Amour thyroid since September 2017.  She is in euthyroid state.  Her thyroid function test is within normal., However, patient reported that she feels difficult to decrease her body weight with levothyroxine compared to when she was taking [place] thyoid. She also noticed feeling cold intolerance more. She wants to try to increasing the dose of levothyroxine, but she also does not want to over treated or having side effects from taking too much thyroid medicine.    I discussed with her that I prefer her to continue levothyroxine with the same dose 50 µg every morning on an empty stomach, but if she insists to increase the dose, I will recommend prescription to try taking additional half dose on every Sunday and see that she have any side effects. Patient agreed with the plan.    Results for ЕЛЕНА MALIK (MRN [id number]) as of 6/2/2018 15:09   Ref. Range 11/14/2017 08:42 5/15/2018 08:56   TSH Latest Ref Range: 0.450 - 4.500 uIU/mL 3.790 3.260   Free T-4 Latest Ref Range: 0.82 - 1.77 ng/dL 1.28 1.11       Dyslipidemia  Patient tries to control her cholesterol with diet and exercise.  She does not want to take statin.  I reviewed the risks and benefits of starting treatment and primary prevention of cardiovascular disease from statin treatment.  Her cholesterol levels are slightly improved with diet and exercise.  I reviewed her blood test with her in clinic today.    Results for ЕЛЕНА MALIK (MRN [id number]) as of 6/2/2018 15:09   Ref. Range 6/11/2009 07:55 11/14/2017 08:42 5/15/2018 08:56   Cholesterol,Tot Latest Ref Range: 100 - 199 mg/dL 212 (H) 212 (H) 207 (H)   Triglycerides Latest Ref Range: 0 - 149 mg/dL 166 (H) 74 64   HDL Latest Ref Range: >39 mg/dL 51 77 78   LDL Latest Ref  "Range: 0 - 99 mg/dL 128 (H) 120 (H) 116 (H)       IFG (impaired fasting glucose)  Patient tries to control her blood sugar with diet and exercise.  She has history of impaired fasting blood sugar which is improved with diet.  She reported family history of diabetes.  Patient denied polyuria or polydipsia.    Vitamin D insufficiency  Patient is taking vitamin D 2000 units daily.  Vitamin D level improved with current supplements.  Last vitamin D level was 60.7 on 11/14/17.    Results for EMILI MALIK (MRN [id number]) as of 6/2/2018 15:09   Ref. Range 6/11/2009 07:55 11/14/2017 08:42   25-Hydroxy   Vitamin D 25 Latest Ref Range: 30.0 - 100.0 ng/mL 30.5 (L) 60.7       Primary insomnia  Patient is taking trazodone 25-50 mg nightly as needed for sleep.  She denies side effects from taking trazodone.  She wants to continue trazodone for as needed to help her sleep.         Current medicines (including changes today)  Current Outpatient Prescriptions   Medication Sig Dispense Refill   • levothyroxine (SYNTHROID) 50 MCG Tab Take 1 Tab by mouth Every morning on an empty stomach. 90 Tab 3   • Cholecalciferol (VITAMIN D) 2000 units Cap Take  by mouth.     • trazodone (DESYREL) 50 MG Tab Take 0.5 Tabs by mouth at bedtime as needed for Sleep. 90 Tab 3     No current facility-administered medications for this visit.      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    ROS   No chest pain, no shortness of breath, no abdominal pain       Objective:     Blood pressure 112/68, pulse 69, temperature 37.3 °C (99.2 °F), resp. rate 12, height 1.613 m (5' 3.5\"), weight 57 kg (125 lb 9.6 oz), SpO2 93 %. Body mass index is 21.9 kg/m².   Physical Exam:  General: Alert, oriented and no acute distress.  Eye contact is good, speech goal directed, affect " calm  HEENT: conjunctiva non-injected, sclera non-icteric.  Oral mucous membranes pink and moist with no lesions.  Pinna normal.  Lungs: Normal respiratory effort, clear to auscultation bilaterally with good excursion.  CV: regular rate and rhythm. No murmurs.   Abdomen: soft, non distended, nontender, Bowel sound normal.  Ext: no edema, color normal, vascularity normal, temperature normal      Assessment and Plan:   The following treatment plan was discussed     1. Hypothyroidism due to acquired atrophy of thyroid  - Thyroid hormone within normal. Recommend to continue levothyroxine 50 µg one tablet every morning on empty stomach. Patient would like to increase the dose of levothyroxine. I discussed the risks and benefit of levothyroxine treatment. She can try to take additional half dose of levothyroxine once a week only and see if she has any side effects. Will repeat thyroid function test before next follow-up visit.  - levothyroxine (SYNTHROID) 50 MCG Tab; Take 1 Tab by mouth Every morning on an empty stomach.  Dispense: 90 Tab; Refill: 3  - CBC WITH DIFFERENTIAL; Future  - TSH; Future  - FREE THYROXINE; Future    2. Dyslipidemia  - Slightly improved with diet and exercise. Continue to control with diet and exercise.  - Advised to eat low fat, low carbohydrate and high fiber diet as well as do cardio physical exercise regularly.   - COMP METABOLIC PANEL; Future  - LIPID PROFILE; Future    3. IFG (impaired fasting glucose)  - Discussed to avoid simple carbohydrates and pulses sugar. Discussed to continue regular cardio exercise. Recheck lab in 6 month.  - COMP METABOLIC PANEL; Future  - HEMOGLOBIN A1C; Future    4. Vitamin D insufficiency  - Well-controlled. Continue current regimens.    5. Primary insomnia  - Discussed sleep hygiene. Go to bed and wake up at consistent times whether work/school day or not. Keep room dark, quiet, and comfortable. Don't nap in late afternoon and don't nap more than an hour.  Increase exposure to sunlight during awake times and avoid bright lights before going to sleep. Avoid stimulant or caffeine use more than 4 hours after wake time.   - Patient will take trazodone 25 mg daily at bedtime when necessary. Reviewed potential side effects of trazodone with patient.        Followup: Return in about 6 months (around 12/4/2018), or if symptoms worsen or fail to improve, for hypothyroid, hyperlipidemia, impaired fasting glucose, insomnia, lab review.      Please note that this dictation was created using voice recognition software. I have made every reasonable attempt to correct obvious errors, but I expect that there may have unintended errors in text, spelling, punctuation, or grammar that I did not discover.

## 2018-06-15 ENCOUNTER — OFFICE VISIT (OUTPATIENT)
Dept: URGENT CARE | Facility: CLINIC | Age: 68
End: 2018-06-15
Payer: COMMERCIAL

## 2018-06-15 VITALS
HEIGHT: 64 IN | TEMPERATURE: 97.5 F | SYSTOLIC BLOOD PRESSURE: 116 MMHG | HEART RATE: 95 BPM | WEIGHT: 127 LBS | OXYGEN SATURATION: 96 % | DIASTOLIC BLOOD PRESSURE: 78 MMHG | BODY MASS INDEX: 21.68 KG/M2 | RESPIRATION RATE: 14 BRPM

## 2018-06-15 DIAGNOSIS — M54.2 NECK PAIN: ICD-10-CM

## 2018-06-15 DIAGNOSIS — M62.838 MUSCLE SPASM: ICD-10-CM

## 2018-06-15 PROCEDURE — 99203 OFFICE O/P NEW LOW 30 MIN: CPT | Performed by: PHYSICIAN ASSISTANT

## 2018-06-15 RX ORDER — KETOROLAC TROMETHAMINE 30 MG/ML
30 INJECTION, SOLUTION INTRAMUSCULAR; INTRAVENOUS ONCE
Status: COMPLETED | OUTPATIENT
Start: 2018-06-15 | End: 2018-06-15

## 2018-06-15 RX ORDER — CYCLOBENZAPRINE HCL 10 MG
10 TABLET ORAL 3 TIMES DAILY PRN
Qty: 30 TAB | Refills: 0 | Status: SHIPPED | OUTPATIENT
Start: 2018-06-15 | End: 2019-02-28

## 2018-06-15 RX ADMIN — KETOROLAC TROMETHAMINE 30 MG: 30 INJECTION, SOLUTION INTRAMUSCULAR; INTRAVENOUS at 17:43

## 2018-06-15 ASSESSMENT — ENCOUNTER SYMPTOMS
NECK PAIN: 1
SENSORY CHANGE: 0
SORE THROAT: 0
COUGH: 0
MYALGIAS: 0
HEADACHES: 0
BACK PAIN: 1
FOCAL WEAKNESS: 0
CHILLS: 0
FEVER: 0

## 2018-06-15 NOTE — PROGRESS NOTES
"Subjective:   Елена San is a 67 y.o. female who presents for Back Pain (cervical pain)    Patient reports a long-term history of chronic back problems and intermittent neck pain. She reports approximately 6 months ago she began to experience increasing back pain and went to a chiropractor who adjusted her back that caused her to have an increase in neck pain. She has since changed chiropractors. However, she reports a significant increase in neck pain over the past 3 days with stiffness of the neck. She reports difficulty getting comfortable especially at night. She has been using topical analgesics without any improvement. She has tried Aleve with minimal improvement. Patient denies any numbness, tingling, weakness of the extremities.         Back Pain   Pertinent negatives include no chest pain, fever or headaches.     Review of Systems   Constitutional: Negative for chills, fever and malaise/fatigue.   HENT: Negative for congestion, ear pain and sore throat.    Respiratory: Negative for cough.    Cardiovascular: Negative for chest pain.   Musculoskeletal: Positive for back pain and neck pain. Negative for joint pain and myalgias.   Neurological: Negative for sensory change, focal weakness and headaches.   All other systems reviewed and are negative.    No Known Allergies     Objective:   /78   Pulse 95   Temp 36.4 °C (97.5 °F)   Resp 14   Ht 1.613 m (5' 3.5\")   Wt 57.6 kg (127 lb)   SpO2 96%   BMI 22.14 kg/m²   Physical Exam   Constitutional: She is oriented to person, place, and time. She appears well-developed and well-nourished.   HENT:   Head: Normocephalic and atraumatic.   Right Ear: External ear normal.   Left Ear: External ear normal.   Nose: Nose normal.   Mouth/Throat: Oropharynx is clear and moist. No oropharyngeal exudate.   Eyes: Conjunctivae and EOM are normal. Pupils are equal, round, and reactive to light.   Neck: Normal range of motion. Neck supple.   Cardiovascular: Normal rate, " regular rhythm and normal heart sounds.  Exam reveals no gallop and no friction rub.    No murmur heard.  Pulmonary/Chest: Effort normal and breath sounds normal. No respiratory distress. She has no wheezes. She has no rales.   Abdominal: Soft. Bowel sounds are normal. She exhibits no distension and no mass. There is no tenderness. There is no rebound and no guarding.   Musculoskeletal: She exhibits no edema or deformity.        Cervical back: She exhibits decreased range of motion, tenderness and spasm. She exhibits no bony tenderness and no deformity.        Back:         Arms:  Lymphadenopathy:     She has no cervical adenopathy.   Neurological: She is alert and oriented to person, place, and time. She has normal strength. No cranial nerve deficit or sensory deficit. Coordination normal.   Reflex Scores:       Tricep reflexes are 2+ on the right side and 2+ on the left side.       Bicep reflexes are 2+ on the right side and 2+ on the left side.       Brachioradialis reflexes are 2+ on the right side and 2+ on the left side.       Patellar reflexes are 2+ on the right side and 2+ on the left side.       Achilles reflexes are 2+ on the left side.  Skin: Skin is warm and dry. No rash noted.   Psychiatric: She has a normal mood and affect. Judgment normal.          Assessment/Plan:   Assessment    1. Neck pain  - ketorolac (TORADOL) injection 30 mg; 1 mL by Intramuscular route Once.    Patient is given Toradol 30 mg IM here in the clinic. She is given a prescription for Flexeril. She is encouraged to go home take a Flexeril and lay down with some moist heat. I did encourage her to schedule an appointment with her primary care for follow-up so that if this is not improving she can be connected in for possible further imaging if indicated.    2. Muscle spasm  - cyclobenzaprine (FLEXERIL) 10 MG Tab; Take 1 Tab by mouth 3 times a day as needed.  Dispense: 30 Tab; Refill: 0    Differential diagnosis, natural history,  supportive care, and indications for immediate follow-up discussed.      If not improving in 3-5 days, F/U with PCP or return to UC or sooner if worsens  Strict ER precautions given.    Please note that this note was created using voice recognition speech to text software. Every effort has been made to correct obvious errors.  However, I expect there are errors of grammar and possibly context that were not discovered prior to finalizing the note

## 2018-11-21 LAB
ALBUMIN SERPL-MCNC: 4.3 G/DL (ref 3.6–4.8)
ALBUMIN/GLOB SERPL: 1.7 {RATIO} (ref 1.2–2.2)
ALP SERPL-CCNC: 42 IU/L (ref 39–117)
ALT SERPL-CCNC: 14 IU/L (ref 0–32)
AST SERPL-CCNC: 20 IU/L (ref 0–40)
BASOPHILS # BLD AUTO: 0 X10E3/UL (ref 0–0.2)
BASOPHILS NFR BLD AUTO: 1 %
BILIRUB SERPL-MCNC: 0.6 MG/DL (ref 0–1.2)
BUN SERPL-MCNC: 14 MG/DL (ref 8–27)
BUN/CREAT SERPL: 13 (ref 12–28)
CALCIUM SERPL-MCNC: 9.5 MG/DL (ref 8.7–10.3)
CHLORIDE SERPL-SCNC: 102 MMOL/L (ref 96–106)
CHOLEST SERPL-MCNC: 182 MG/DL (ref 100–199)
CO2 SERPL-SCNC: 20 MMOL/L (ref 20–29)
CREAT SERPL-MCNC: 1.05 MG/DL (ref 0.57–1)
EOSINOPHIL # BLD AUTO: 0.1 X10E3/UL (ref 0–0.4)
EOSINOPHIL NFR BLD AUTO: 2 %
ERYTHROCYTE [DISTWIDTH] IN BLOOD BY AUTOMATED COUNT: 13 % (ref 12.3–15.4)
GLOBULIN SER CALC-MCNC: 2.5 G/DL (ref 1.5–4.5)
GLUCOSE SERPL-MCNC: 93 MG/DL (ref 65–99)
HBA1C MFR BLD: 5 % (ref 4.8–5.6)
HCT VFR BLD AUTO: 43.1 % (ref 34–46.6)
HDLC SERPL-MCNC: 73 MG/DL
HGB BLD-MCNC: 14.8 G/DL (ref 11.1–15.9)
IF AFRICAN AMERICAN  100797: 64 ML/MIN/1.73
IF NON AFRICAN AMER 100791: 55 ML/MIN/1.73
IMM GRANULOCYTES # BLD: 0 X10E3/UL (ref 0–0.1)
IMM GRANULOCYTES NFR BLD: 0 %
IMMATURE CELLS  115398: NORMAL
LABORATORY COMMENT REPORT: NORMAL
LDLC SERPL CALC-MCNC: 94 MG/DL (ref 0–99)
LYMPHOCYTES # BLD AUTO: 1.5 X10E3/UL (ref 0.7–3.1)
LYMPHOCYTES NFR BLD AUTO: 38 %
MCH RBC QN AUTO: 33 PG (ref 26.6–33)
MCHC RBC AUTO-ENTMCNC: 34.3 G/DL (ref 31.5–35.7)
MCV RBC AUTO: 96 FL (ref 79–97)
MONOCYTES # BLD AUTO: 0.3 X10E3/UL (ref 0.1–0.9)
MONOCYTES NFR BLD AUTO: 7 %
MORPHOLOGY BLD-IMP: NORMAL
NEUTROPHILS # BLD AUTO: 2 X10E3/UL (ref 1.4–7)
NEUTROPHILS NFR BLD AUTO: 52 %
NRBC BLD AUTO-RTO: NORMAL %
PLATELET # BLD AUTO: 309 X10E3/UL (ref 150–379)
POTASSIUM SERPL-SCNC: 4.3 MMOL/L (ref 3.5–5.2)
PROT SERPL-MCNC: 6.8 G/DL (ref 6–8.5)
RBC # BLD AUTO: 4.49 X10E6/UL (ref 3.77–5.28)
SODIUM SERPL-SCNC: 139 MMOL/L (ref 134–144)
T4 FREE SERPL-MCNC: 1.35 NG/DL (ref 0.82–1.77)
TRIGL SERPL-MCNC: 74 MG/DL (ref 0–149)
TSH SERPL DL<=0.005 MIU/L-ACNC: 3.23 UIU/ML (ref 0.45–4.5)
VLDLC SERPL CALC-MCNC: 15 MG/DL (ref 5–40)
WBC # BLD AUTO: 3.8 X10E3/UL (ref 3.4–10.8)

## 2019-02-28 ENCOUNTER — OFFICE VISIT (OUTPATIENT)
Dept: MEDICAL GROUP | Facility: MEDICAL CENTER | Age: 69
End: 2019-02-28
Payer: COMMERCIAL

## 2019-02-28 VITALS
DIASTOLIC BLOOD PRESSURE: 62 MMHG | SYSTOLIC BLOOD PRESSURE: 100 MMHG | TEMPERATURE: 98 F | HEIGHT: 64 IN | OXYGEN SATURATION: 98 % | WEIGHT: 125 LBS | BODY MASS INDEX: 21.34 KG/M2 | HEART RATE: 60 BPM | RESPIRATION RATE: 16 BRPM

## 2019-02-28 DIAGNOSIS — F51.01 PRIMARY INSOMNIA: ICD-10-CM

## 2019-02-28 DIAGNOSIS — E03.4 HYPOTHYROIDISM DUE TO ACQUIRED ATROPHY OF THYROID: ICD-10-CM

## 2019-02-28 DIAGNOSIS — E78.5 DYSLIPIDEMIA: Chronic | ICD-10-CM

## 2019-02-28 DIAGNOSIS — R94.4 DECREASED GFR: ICD-10-CM

## 2019-02-28 DIAGNOSIS — Z23 NEED FOR VACCINATION: ICD-10-CM

## 2019-02-28 DIAGNOSIS — Z12.31 SCREENING MAMMOGRAM, ENCOUNTER FOR: ICD-10-CM

## 2019-02-28 PROCEDURE — 90471 IMMUNIZATION ADMIN: CPT | Performed by: NURSE PRACTITIONER

## 2019-02-28 PROCEDURE — 90732 PPSV23 VACC 2 YRS+ SUBQ/IM: CPT | Performed by: NURSE PRACTITIONER

## 2019-02-28 PROCEDURE — 99214 OFFICE O/P EST MOD 30 MIN: CPT | Mod: 25 | Performed by: NURSE PRACTITIONER

## 2019-02-28 RX ORDER — TRAZODONE HYDROCHLORIDE 50 MG/1
25 TABLET ORAL
COMMUNITY
Start: 2019-02-28 | End: 2020-01-16 | Stop reason: SDUPTHER

## 2019-02-28 ASSESSMENT — PATIENT HEALTH QUESTIONNAIRE - PHQ9: CLINICAL INTERPRETATION OF PHQ2 SCORE: 0

## 2019-02-28 NOTE — ASSESSMENT & PLAN NOTE
Labs were done after she had a neck strain and had been taking ibuprofen twice daily for several weeks.  She had also recently started taking red yeast rice for her cholesterol.  She has since stopped both of these.  She denies any urinary issues.  She tries to stay well-hydrated.

## 2019-02-28 NOTE — ASSESSMENT & PLAN NOTE
Maintains a healthy diet high in fish, fiber, vegetables, and fruit. No red meat or pork. Occ chicken. Once she started cardio exercise her cholesterol improved. She had also started taking Red Yeast Rice supplement.

## 2019-02-28 NOTE — PROGRESS NOTES
Елена San is a 68 y.o. female here to establish care and discuss the following:  HPI:      Dyslipidemia  Maintains a healthy diet high in fish, fiber, vegetables, and fruit. No red meat or pork. Occ chicken. Once she started cardio exercise her cholesterol improved. She had also started taking Red Yeast Rice supplement.     Decreased GFR  Labs were done after she had a neck strain and had been taking ibuprofen twice daily for several weeks.  She had also recently started taking red yeast rice for her cholesterol.  She has since stopped both of these.  She denies any urinary issues.  She tries to stay well-hydrated.    Hypothyroidism due to acquired atrophy of thyroid  Stable on levothyroxine 50 mcg daily.  Was switched from [place] Thyroid due to age.  States that her symptoms were better controlled with [place] Thyroid as she does have some issues with weight gain and fatigue now despite exercising 5 days a week and eating very healthily.    She denies hair and skin changes, constipation.    Current medicines (including changes today)  Current Outpatient Prescriptions   Medication Sig Dispense Refill   • traZODone (DESYREL) 50 MG Tab Take 0.5 Tabs by mouth at bedtime as needed for Sleep.     • levothyroxine (SYNTHROID) 50 MCG Tab Take 1 Tab by mouth Every morning on an empty stomach. 90 Tab 3   • Cholecalciferol (VITAMIN D) 2000 units Cap Take  by mouth.       No current facility-administered medications for this visit.      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  She  has a past surgical history that includes primary c section.  Social History   Substance Use Topics   • Smoking status: Former Smoker     Packs/day: 1.00     Quit date: 9/5/1972   • Smokeless tobacco: Never Used      Comment: only smoked 3 years   •  "Alcohol use 1.8 oz/week     3 Glasses of wine per week     Social History     Social History Narrative   • No narrative on file     Family History   Problem Relation Age of Onset   • Hypertension Mother    • Diabetes Mother    • Hyperlipidemia Mother    • Cancer Father         throat cancer   • Diabetes Brother    • Hypertension Brother    • Hyperlipidemia Brother    • Diabetes Brother    • Hypertension Brother    • Hyperlipidemia Brother      Family Status   Relation Status   • Mo Alive   • Fa    • Bro Alive   • Bro Alive         ROS  No chest pain, no abdominal pain, no rash.  Positive ROS as per HPI.  All other systems reviewed and are negative      Objective:     Blood pressure 100/62, pulse 60, temperature 36.7 °C (98 °F), temperature source Temporal, resp. rate 16, height 1.613 m (5' 3.5\"), weight 56.7 kg (125 lb), SpO2 98 %, not currently breastfeeding. Body mass index is 21.79 kg/m².  Physical Exam:    Constitutional: Alert, no distress.  Skin: Warm, dry, good turgor, no rashes in visible areas.  Eye: Equal, round, conjunctiva clear, lids normal.  ENMT: Lips without lesions, good dentition  Neck: Trachea midline  Respiratory: Unlabored respiratory effort  Cardiovascular: No edema.  Psych: Alert and oriented x3, normal affect and mood.      Assessment and Plan:   The following treatment plan was discussed    1. Hypothyroidism due to acquired atrophy of thyroid  Stable  Continue levothyroxine daily  Labs due 2019    2. Dyslipidemia  Stable  Continue healthy diet and exercise    3. Decreased GFR  Unstable  Recheck BMP in 3 months  - Basic Metabolic Panel; Future    4. Primary insomnia  Med rec updated  - traZODone (DESYREL) 50 MG Tab; Take 0.5 Tabs by mouth at bedtime as needed for Sleep.    5. Screening mammogram, encounter for  - MA-SCREEN MAMMO W/CAD-BILAT; Future    6. Need for vaccination  - Pneumococal Polysaccharide Vaccine 23-Valent =>1YO SQ/IM      Records reviewed  Followup: Return in " about 9 months (around 11/28/2019) for Annual.    I have placed the below orders and discussed them with an approved delegating provider. The MA is performing the below orders under the direction of Dr. Cesar

## 2019-02-28 NOTE — ASSESSMENT & PLAN NOTE
Stable on levothyroxine 50 mcg daily.  Was switched from [place] Thyroid due to age.  States that her symptoms were better controlled with [place] Thyroid as she does have some issues with weight gain and fatigue now despite exercising 5 days a week and eating very healthily.    She denies hair and skin changes, constipation.

## 2019-03-14 ENCOUNTER — HOSPITAL ENCOUNTER (OUTPATIENT)
Dept: RADIOLOGY | Facility: MEDICAL CENTER | Age: 69
End: 2019-03-14
Attending: NURSE PRACTITIONER
Payer: COMMERCIAL

## 2019-03-14 DIAGNOSIS — Z12.31 VISIT FOR SCREENING MAMMOGRAM: ICD-10-CM

## 2019-03-14 PROCEDURE — 77067 SCR MAMMO BI INCL CAD: CPT

## 2019-03-20 ENCOUNTER — TELEPHONE (OUTPATIENT)
Dept: MEDICAL GROUP | Facility: MEDICAL CENTER | Age: 69
End: 2019-03-20

## 2019-03-20 NOTE — TELEPHONE ENCOUNTER
----- Message from ROMELIA Eduardo sent at 3/20/2019  1:00 PM PDT -----  Patient's mammogram is normal but shows dense breast tissue. That makes it difficult to detect small abnormalities with a normal mammogram. There is another test- sonocine- that is better for women with dense breasts. This, unfortunately, is not covered by insurance and costs about $125. If she would like to have this done I will arrange it, let me know. Otherwise plan to repeat mammogram in 1 year.    ROMELIA Eduardo

## 2019-06-05 ENCOUNTER — TELEPHONE (OUTPATIENT)
Dept: MEDICAL GROUP | Facility: MEDICAL CENTER | Age: 69
End: 2019-06-05

## 2019-06-05 LAB
BUN SERPL-MCNC: 14 MG/DL (ref 8–27)
BUN/CREAT SERPL: 15 (ref 12–28)
CALCIUM SERPL-MCNC: 9.6 MG/DL (ref 8.7–10.3)
CHLORIDE SERPL-SCNC: 104 MMOL/L (ref 96–106)
CO2 SERPL-SCNC: 22 MMOL/L (ref 20–29)
CREAT SERPL-MCNC: 0.96 MG/DL (ref 0.57–1)
GLUCOSE SERPL-MCNC: 102 MG/DL (ref 65–99)
POTASSIUM SERPL-SCNC: 4.4 MMOL/L (ref 3.5–5.2)
SODIUM SERPL-SCNC: 142 MMOL/L (ref 134–144)

## 2019-12-22 ENCOUNTER — OFFICE VISIT (OUTPATIENT)
Dept: URGENT CARE | Facility: CLINIC | Age: 69
End: 2019-12-22
Payer: COMMERCIAL

## 2019-12-22 VITALS
BODY MASS INDEX: 20.49 KG/M2 | OXYGEN SATURATION: 97 % | WEIGHT: 120 LBS | HEIGHT: 64 IN | DIASTOLIC BLOOD PRESSURE: 64 MMHG | SYSTOLIC BLOOD PRESSURE: 114 MMHG | TEMPERATURE: 98.8 F | RESPIRATION RATE: 16 BRPM | HEART RATE: 68 BPM

## 2019-12-22 DIAGNOSIS — J22 LRTI (LOWER RESPIRATORY TRACT INFECTION): ICD-10-CM

## 2019-12-22 PROCEDURE — 99214 OFFICE O/P EST MOD 30 MIN: CPT | Performed by: PHYSICIAN ASSISTANT

## 2019-12-22 RX ORDER — BENZONATATE 100 MG/1
100 CAPSULE ORAL 3 TIMES DAILY PRN
Qty: 60 CAP | Refills: 0 | Status: SHIPPED | OUTPATIENT
Start: 2019-12-22 | End: 2020-01-16

## 2019-12-22 RX ORDER — CODEINE PHOSPHATE/GUAIFENESIN 10-100MG/5
5 LIQUID (ML) ORAL
Qty: 50 ML | Refills: 0 | Status: SHIPPED | OUTPATIENT
Start: 2019-12-22 | End: 2020-01-01

## 2019-12-22 RX ORDER — AZITHROMYCIN 250 MG/1
TABLET, FILM COATED ORAL
Qty: 6 TAB | Refills: 0 | Status: SHIPPED | OUTPATIENT
Start: 2019-12-22 | End: 2020-01-16

## 2019-12-22 RX ORDER — PREDNISONE 20 MG/1
40 TABLET ORAL DAILY
Qty: 10 TAB | Refills: 0 | Status: SHIPPED | OUTPATIENT
Start: 2019-12-22 | End: 2019-12-27

## 2019-12-22 ASSESSMENT — ENCOUNTER SYMPTOMS
MYALGIAS: 0
HEADACHES: 0
SPUTUM PRODUCTION: 1
DIZZINESS: 0
ABDOMINAL PAIN: 0
COUGH: 1
SORE THROAT: 1
SINUS PAIN: 0
EYE DISCHARGE: 0
EYE REDNESS: 0
FEVER: 0
CHILLS: 0
SHORTNESS OF BREATH: 1
NAUSEA: 0
VOMITING: 0
WHEEZING: 1
DIARRHEA: 0

## 2019-12-22 ASSESSMENT — PAIN SCALES - GENERAL: PAINLEVEL: NO PAIN

## 2019-12-22 NOTE — PROGRESS NOTES
Subjective:      Елена San is a 69 y.o. female who presents with Congestion (cough, laryngitis, chest feels tight, yellow/brown/clear phlegm  x 1 week)            HPI  69-year-old female presents to urgent care with new problem of nasal congestion, productive cough, chest tightness, mild shortness of breath and sore throat onset about 1 week ago.  Patient has tried OTC cough suppressant and cold medications with minimal improvement.   Patient has flu vaccination this season.   Denies other associated aggravating or alleviating factors.      Review of Systems   Constitutional: Positive for malaise/fatigue. Negative for chills and fever.   HENT: Positive for congestion and sore throat. Negative for ear pain and sinus pain.    Eyes: Negative for discharge and redness.   Respiratory: Positive for cough, sputum production, shortness of breath and wheezing.    Cardiovascular: Negative for chest pain.        Chest tightness secondary to cough    Gastrointestinal: Negative for abdominal pain, diarrhea, nausea and vomiting.   Genitourinary: Negative for dysuria.   Musculoskeletal: Negative for myalgias.   Neurological: Negative for dizziness and headaches.   Endo/Heme/Allergies: Negative for environmental allergies.       Past Medical History:   Diagnosis Date   • Cardiac murmur 7/28/2009   • Dyslipidemia 7/28/2009   • Neoplasm of unspecified nature of bone, soft tissue, and skin 6/2/2009   • Overactive bladder 6/2/2009   • Skin lesion 6/2/2009   • Thyroid disease      Current Outpatient Medications on File Prior to Visit   Medication Sig Dispense Refill   • Phenyleph-Doxylamine-DM-APAP (NYQUIL SEVERE COLD/FLU) 5-6.25- MG/15ML Liquid Take  by mouth.     • Phenylephrine-DM-GG (ROBITUSSIN CHILD COUGH/COLD CF) 2.5-5-50 MG/5ML Liquid Take  by mouth.     • Humidifier/Vaporizer Supplies Misc by Does not apply route.     • Green Tea, Paulina sinensis, (GREEN TEA PO) Take  by mouth.     • levothyroxine (SYNTHROID) 50 MCG  "Tab TAKE 1 TAB BY MOUTH EVERY MORNING ON AN EMPTY STOMACH. 30 Tab 5   • traZODone (DESYREL) 50 MG Tab Take 0.5 Tabs by mouth at bedtime as needed for Sleep.     • Cholecalciferol (VITAMIN D) 2000 units Cap Take  by mouth.       No current facility-administered medications on file prior to visit.      Patient has no known allergies.  Social History     Tobacco Use   • Smoking status: Former Smoker     Packs/day: 1.00     Last attempt to quit: 1972     Years since quittin.3   • Smokeless tobacco: Never Used   • Tobacco comment: only smoked 3 years   Substance Use Topics   • Alcohol use: Yes     Alcohol/week: 1.8 oz     Types: 3 Glasses of wine per week      Objective:     /64 (BP Location: Left arm, Patient Position: Sitting, BP Cuff Size: Adult)   Pulse 68   Temp 37.1 °C (98.8 °F) (Temporal)   Resp 16   Ht 1.613 m (5' 3.5\")   Wt 54.4 kg (120 lb)   SpO2 97%   BMI 20.92 kg/m²      Physical Exam  Vitals signs reviewed.   Constitutional:       Appearance: She is well-developed.   HENT:      Head: Normocephalic and atraumatic.      Right Ear: Tympanic membrane normal.      Left Ear: Tympanic membrane normal.      Nose: Mucosal edema and rhinorrhea present.   Neck:      Musculoskeletal: Normal range of motion and neck supple.   Cardiovascular:      Rate and Rhythm: Normal rate and regular rhythm.      Heart sounds: Normal heart sounds.   Pulmonary:      Effort: Pulmonary effort is normal. No respiratory distress.      Breath sounds: Wheezing present.   Abdominal:      General: Bowel sounds are normal. There is no distension.      Palpations: Abdomen is soft.   Musculoskeletal: Normal range of motion.   Skin:     General: Skin is warm and dry.      Findings: No rash.   Neurological:      Mental Status: She is alert and oriented to person, place, and time.   Psychiatric:         Behavior: Behavior normal.         Thought Content: Thought content normal.         Judgment: Judgment normal.               "   Assessment/Plan:     1. LRTI (lower respiratory tract infection)  predniSONE (DELTASONE) 20 MG Tab    azithromycin (ZITHROMAX) 250 MG Tab    benzonatate (TESSALON) 100 MG Cap    guaifenesin-codeine (TUSSI-ORGANIDIN NR) 100-10 MG/5ML syrup     Continue with over-the-counter cold and flu medications for symptomatic relief.  Recommend use of humidifier at night.  PT should follow up with PCP in 1-2 days for re-evaluation if symptoms have not improved.  Discussed red flags and reasons to return to UC or ED.  Pt and/or family verbalized understanding of diagnosis and follow up instructions and was offered informational handout on diagnosis.  PT discharged.

## 2019-12-29 DIAGNOSIS — E55.9 VITAMIN D INSUFFICIENCY: ICD-10-CM

## 2019-12-29 DIAGNOSIS — R94.4 DECREASED GFR: ICD-10-CM

## 2019-12-29 DIAGNOSIS — E03.4 HYPOTHYROIDISM DUE TO ACQUIRED ATROPHY OF THYROID: ICD-10-CM

## 2019-12-29 DIAGNOSIS — Z00.00 ANNUAL PHYSICAL EXAM: ICD-10-CM

## 2019-12-29 DIAGNOSIS — R73.01 IFG (IMPAIRED FASTING GLUCOSE): ICD-10-CM

## 2019-12-29 DIAGNOSIS — E78.5 DYSLIPIDEMIA: Chronic | ICD-10-CM

## 2020-01-02 RX ORDER — LEVOTHYROXINE SODIUM 0.05 MG/1
50 TABLET ORAL
Qty: 30 TAB | Refills: 2 | Status: SHIPPED | OUTPATIENT
Start: 2020-01-02 | End: 2020-01-16 | Stop reason: SDUPTHER

## 2020-01-02 NOTE — TELEPHONE ENCOUNTER
Refill done. Patient is due for annual appointment and labs. Please have patient schedule. I will order labs today, please advise her to do these prior to appointment.   ADRIAN Eduardo.

## 2020-01-09 ENCOUNTER — HOSPITAL ENCOUNTER (OUTPATIENT)
Dept: LAB | Facility: MEDICAL CENTER | Age: 70
End: 2020-01-09
Attending: NURSE PRACTITIONER
Payer: COMMERCIAL

## 2020-01-09 DIAGNOSIS — E03.4 HYPOTHYROIDISM DUE TO ACQUIRED ATROPHY OF THYROID: ICD-10-CM

## 2020-01-09 DIAGNOSIS — E78.5 DYSLIPIDEMIA: Chronic | ICD-10-CM

## 2020-01-09 DIAGNOSIS — Z00.00 ANNUAL PHYSICAL EXAM: ICD-10-CM

## 2020-01-09 DIAGNOSIS — R73.01 IFG (IMPAIRED FASTING GLUCOSE): ICD-10-CM

## 2020-01-09 DIAGNOSIS — R94.4 DECREASED GFR: ICD-10-CM

## 2020-01-09 DIAGNOSIS — E55.9 VITAMIN D INSUFFICIENCY: ICD-10-CM

## 2020-01-09 LAB
25(OH)D3 SERPL-MCNC: 66 NG/ML (ref 30–100)
ALBUMIN SERPL BCP-MCNC: 4 G/DL (ref 3.2–4.9)
ALBUMIN/GLOB SERPL: 1.8 G/DL
ALP SERPL-CCNC: 39 U/L (ref 30–99)
ALT SERPL-CCNC: 16 U/L (ref 2–50)
ANION GAP SERPL CALC-SCNC: 9 MMOL/L (ref 0–11.9)
AST SERPL-CCNC: 20 U/L (ref 12–45)
BASOPHILS # BLD AUTO: 0.9 % (ref 0–1.8)
BASOPHILS # BLD: 0.03 K/UL (ref 0–0.12)
BILIRUB SERPL-MCNC: 0.6 MG/DL (ref 0.1–1.5)
BUN SERPL-MCNC: 15 MG/DL (ref 8–22)
CALCIUM SERPL-MCNC: 8.9 MG/DL (ref 8.5–10.5)
CHLORIDE SERPL-SCNC: 104 MMOL/L (ref 96–112)
CHOLEST SERPL-MCNC: 197 MG/DL (ref 100–199)
CO2 SERPL-SCNC: 26 MMOL/L (ref 20–33)
CREAT SERPL-MCNC: 0.98 MG/DL (ref 0.5–1.4)
EOSINOPHIL # BLD AUTO: 0.12 K/UL (ref 0–0.51)
EOSINOPHIL NFR BLD: 3.4 % (ref 0–6.9)
ERYTHROCYTE [DISTWIDTH] IN BLOOD BY AUTOMATED COUNT: 45.6 FL (ref 35.9–50)
FASTING STATUS PATIENT QL REPORTED: NORMAL
GLOBULIN SER CALC-MCNC: 2.2 G/DL (ref 1.9–3.5)
GLUCOSE SERPL-MCNC: 82 MG/DL (ref 65–99)
HCT VFR BLD AUTO: 44.3 % (ref 37–47)
HDLC SERPL-MCNC: 67 MG/DL
HGB BLD-MCNC: 14.7 G/DL (ref 12–16)
IMM GRANULOCYTES # BLD AUTO: 0 K/UL (ref 0–0.11)
IMM GRANULOCYTES NFR BLD AUTO: 0 % (ref 0–0.9)
LDLC SERPL CALC-MCNC: 111 MG/DL
LYMPHOCYTES # BLD AUTO: 1.5 K/UL (ref 1–4.8)
LYMPHOCYTES NFR BLD: 43 % (ref 22–41)
MCH RBC QN AUTO: 32.8 PG (ref 27–33)
MCHC RBC AUTO-ENTMCNC: 33.2 G/DL (ref 33.6–35)
MCV RBC AUTO: 98.9 FL (ref 81.4–97.8)
MONOCYTES # BLD AUTO: 0.3 K/UL (ref 0–0.85)
MONOCYTES NFR BLD AUTO: 8.6 % (ref 0–13.4)
NEUTROPHILS # BLD AUTO: 1.54 K/UL (ref 2–7.15)
NEUTROPHILS NFR BLD: 44.1 % (ref 44–72)
NRBC # BLD AUTO: 0 K/UL
NRBC BLD-RTO: 0 /100 WBC
PLATELET # BLD AUTO: 317 K/UL (ref 164–446)
PMV BLD AUTO: 10.2 FL (ref 9–12.9)
POTASSIUM SERPL-SCNC: 4.1 MMOL/L (ref 3.6–5.5)
PROT SERPL-MCNC: 6.2 G/DL (ref 6–8.2)
RBC # BLD AUTO: 4.48 M/UL (ref 4.2–5.4)
SODIUM SERPL-SCNC: 139 MMOL/L (ref 135–145)
TRIGL SERPL-MCNC: 93 MG/DL (ref 0–149)
WBC # BLD AUTO: 3.5 K/UL (ref 4.8–10.8)

## 2020-01-09 PROCEDURE — 80053 COMPREHEN METABOLIC PANEL: CPT

## 2020-01-09 PROCEDURE — 85025 COMPLETE CBC W/AUTO DIFF WBC: CPT

## 2020-01-09 PROCEDURE — 83036 HEMOGLOBIN GLYCOSYLATED A1C: CPT

## 2020-01-09 PROCEDURE — 82306 VITAMIN D 25 HYDROXY: CPT

## 2020-01-09 PROCEDURE — 84439 ASSAY OF FREE THYROXINE: CPT

## 2020-01-09 PROCEDURE — 36415 COLL VENOUS BLD VENIPUNCTURE: CPT

## 2020-01-09 PROCEDURE — 84443 ASSAY THYROID STIM HORMONE: CPT

## 2020-01-09 PROCEDURE — 80061 LIPID PANEL: CPT

## 2020-01-10 LAB
EST. AVERAGE GLUCOSE BLD GHB EST-MCNC: 105 MG/DL
HBA1C MFR BLD: 5.3 % (ref 0–5.6)
T4 FREE SERPL-MCNC: 1 NG/DL (ref 0.53–1.43)
TSH SERPL DL<=0.005 MIU/L-ACNC: 2.9 UIU/ML (ref 0.38–5.33)

## 2020-01-13 ENCOUNTER — TELEPHONE (OUTPATIENT)
Dept: MEDICAL GROUP | Facility: MEDICAL CENTER | Age: 70
End: 2020-01-13

## 2020-01-14 NOTE — TELEPHONE ENCOUNTER
Pt notified  ----- Message from ROMELIA Eduardo sent at 1/13/2020  3:49 PM PST -----  Will discuss lab results at future appointment.   ROMELIA Eduardo

## 2020-01-16 ENCOUNTER — OFFICE VISIT (OUTPATIENT)
Dept: MEDICAL GROUP | Facility: MEDICAL CENTER | Age: 70
End: 2020-01-16
Payer: COMMERCIAL

## 2020-01-16 VITALS
SYSTOLIC BLOOD PRESSURE: 120 MMHG | HEART RATE: 65 BPM | DIASTOLIC BLOOD PRESSURE: 70 MMHG | WEIGHT: 123 LBS | HEIGHT: 64 IN | TEMPERATURE: 97 F | OXYGEN SATURATION: 95 % | BODY MASS INDEX: 21 KG/M2 | RESPIRATION RATE: 16 BRPM

## 2020-01-16 DIAGNOSIS — Z78.0 POSTMENOPAUSAL: ICD-10-CM

## 2020-01-16 DIAGNOSIS — E03.4 HYPOTHYROIDISM DUE TO ACQUIRED ATROPHY OF THYROID: ICD-10-CM

## 2020-01-16 DIAGNOSIS — E78.5 DYSLIPIDEMIA: Chronic | ICD-10-CM

## 2020-01-16 DIAGNOSIS — F51.01 PRIMARY INSOMNIA: ICD-10-CM

## 2020-01-16 PROCEDURE — 99397 PER PM REEVAL EST PAT 65+ YR: CPT | Performed by: NURSE PRACTITIONER

## 2020-01-16 RX ORDER — TRAZODONE HYDROCHLORIDE 50 MG/1
25 TABLET ORAL
Qty: 30 TAB | Refills: 3 | Status: SHIPPED | OUTPATIENT
Start: 2020-01-16 | End: 2021-01-26

## 2020-01-16 RX ORDER — LEVOTHYROXINE SODIUM 0.05 MG/1
50 TABLET ORAL
Qty: 90 TAB | Refills: 3 | Status: SHIPPED | OUTPATIENT
Start: 2020-01-16 | End: 2021-01-25

## 2020-01-16 ASSESSMENT — PATIENT HEALTH QUESTIONNAIRE - PHQ9: CLINICAL INTERPRETATION OF PHQ2 SCORE: 0

## 2020-01-16 NOTE — PROGRESS NOTES
"Subjective:   Елена San is a 69 y.o. female here today for annual and lab review:    Primary insomnia  Patient is taking trazodone 25-50 mg nightly as needed for sleep.  She denies side effects from taking trazodone.  She wants to continue trazodone for as needed to help her sleep.    Dyslipidemia  Maintains a healthy diet high in fish, fiber, vegetables, and fruit. No red meat or pork. Occ chicken. Once she started cardio exercise her cholesterol improved. She had also started taking Red Yeast Rice supplement.     LDL slightly increased on recent labs, states she was eating more fatty/sugary foods over the holidays but is now back to her normal diet.        Current medicines (including changes today)  Current Outpatient Medications   Medication Sig Dispense Refill   • traZODone (DESYREL) 50 MG Tab Take 0.5 Tabs by mouth at bedtime as needed for Sleep. 30 Tab 3   • levothyroxine (SYNTHROID) 50 MCG Tab Take 1 Tab by mouth Every morning on an empty stomach. 90 Tab 3   • Green Tea, Paulina sinensis, (GREEN TEA PO) Take  by mouth.     • Cholecalciferol (VITAMIN D) 2000 units Cap Take  by mouth.       No current facility-administered medications for this visit.      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    ROS   No chest pain, no shortness of breath, no abdominal pain  Positive ROS as per HPI.  All other systems reviewed and are negative.     Objective:     /70 (BP Location: Right arm, Patient Position: Sitting, BP Cuff Size: Adult)   Pulse 65   Temp 36.1 °C (97 °F) (Temporal)   Resp 16   Ht 1.613 m (5' 3.5\")   Wt 55.8 kg (123 lb)   SpO2 95%  Body mass index is 21.45 kg/m².     Physical Exam:  Constitutional: Alert, no distress.  Skin: Warm, dry, good turgor, no rashes in visible areas.  Eye: Equal, round and " reactive, conjunctiva clear, lids normal.  ENMT: Lips without lesions, good dentition, oropharynx clear.  Neck: Trachea midline, no masses, no thyromegaly. No cervical or supraclavicular lymphadenopathy  Respiratory: Unlabored respiratory effort, lungs clear to auscultation, no wheezes, no ronchi.  Cardiovascular: Normal S1, S2, no murmur, no edema.  Abdomen: Soft, non-tender, no masses, no hepatosplenomegaly.  Psych: Alert and oriented x3, normal affect and mood.      Assessment and Plan:   The following treatment plan was discussed    1. Postmenopausal  Due for bone density in August 2020  Past have been normal.   - DS-BONE DENSITY STUDY (DEXA); Future    2. Hypothyroidism due to acquired atrophy of thyroid  Stable  Refilled levothyroxine  Due for bone density this year  - DS-BONE DENSITY STUDY (DEXA); Future  - levothyroxine (SYNTHROID) 50 MCG Tab; Take 1 Tab by mouth Every morning on an empty stomach.  Dispense: 90 Tab; Refill: 3    3. Primary insomnia  Stable  Continue trazodone as needed, refilled today  - traZODone (DESYREL) 50 MG Tab; Take 0.5 Tabs by mouth at bedtime as needed for Sleep.  Dispense: 30 Tab; Refill: 3    4. Dyslipidemia  Stable  Continue healthy diet and regular exercise.       Followup: Return in about 1 year (around 1/16/2021).    I have placed the below orders and discussed them with an approved delegating provider. The MA is performing the below orders under the direction of Dr. Cesar

## 2020-01-16 NOTE — ASSESSMENT & PLAN NOTE
Maintains a healthy diet high in fish, fiber, vegetables, and fruit. No red meat or pork. Occ chicken. Once she started cardio exercise her cholesterol improved. She had also started taking Red Yeast Rice supplement.     LDL slightly increased on recent labs, states she was eating more fatty/sugary foods over the holidays but is now back to her normal diet.   
Patient is taking trazodone 25-50 mg nightly as needed for sleep.  She denies side effects from taking trazodone.  She wants to continue trazodone for as needed to help her sleep.  
No
Former smoker

## 2020-07-21 ENCOUNTER — APPOINTMENT (OUTPATIENT)
Dept: CARDIOLOGY | Facility: MEDICAL CENTER | Age: 70
End: 2020-07-21
Attending: FAMILY MEDICINE
Payer: COMMERCIAL

## 2020-08-14 ENCOUNTER — HOSPITAL ENCOUNTER (OUTPATIENT)
Dept: RADIOLOGY | Facility: MEDICAL CENTER | Age: 70
End: 2020-08-14
Attending: FAMILY MEDICINE
Payer: COMMERCIAL

## 2020-08-14 ENCOUNTER — HOSPITAL ENCOUNTER (OUTPATIENT)
Dept: RADIOLOGY | Facility: MEDICAL CENTER | Age: 70
End: 2020-08-14
Attending: NURSE PRACTITIONER
Payer: COMMERCIAL

## 2020-08-14 DIAGNOSIS — Z78.0 POSTMENOPAUSAL: ICD-10-CM

## 2020-08-14 DIAGNOSIS — Z12.31 VISIT FOR SCREENING MAMMOGRAM: ICD-10-CM

## 2020-08-14 DIAGNOSIS — E03.4 HYPOTHYROIDISM DUE TO ACQUIRED ATROPHY OF THYROID: ICD-10-CM

## 2020-08-14 PROCEDURE — 77067 SCR MAMMO BI INCL CAD: CPT

## 2020-08-14 PROCEDURE — 77080 DXA BONE DENSITY AXIAL: CPT

## 2020-08-21 ENCOUNTER — HOSPITAL ENCOUNTER (OUTPATIENT)
Dept: RADIOLOGY | Facility: MEDICAL CENTER | Age: 70
End: 2020-08-21
Attending: FAMILY MEDICINE
Payer: COMMERCIAL

## 2020-08-21 DIAGNOSIS — R92.8 ABNORMAL MAMMOGRAM: ICD-10-CM

## 2020-08-21 PROCEDURE — 77065 DX MAMMO INCL CAD UNI: CPT | Mod: RT

## 2021-01-25 DIAGNOSIS — E03.4 HYPOTHYROIDISM DUE TO ACQUIRED ATROPHY OF THYROID: ICD-10-CM

## 2021-01-25 RX ORDER — LEVOTHYROXINE SODIUM 0.05 MG/1
50 TABLET ORAL
Qty: 30 TAB | Refills: 2 | Status: SHIPPED | OUTPATIENT
Start: 2021-01-25 | End: 2021-04-19

## 2021-01-25 NOTE — LETTER
January 26, 2021        Елена San  [address] [place] [address]        Dear Елена:    Yunior,    Our records indicate that you are due for your next annual in-clinic visit. Please give us a call at [phone] and our scheduling team will assist you with scheduling this appointment. Please be advised that we cannot send any further refills without an appointment so please schedule accordingly before your medication runs out so that there is no lapse in therapy. We look forward to seeing you!      If you have any questions or concerns, please don't hesitate to call.        Sincerely,        ADRIAN Eduardo.    Electronically Signed

## 2021-01-26 NOTE — TELEPHONE ENCOUNTER
Refill done. Patient is due for annual appointment. Please have patient schedule.  ADRIAN Eduardo.

## 2021-02-25 ENCOUNTER — NURSE TRIAGE (OUTPATIENT)
Dept: HEALTH INFORMATION MANAGEMENT | Facility: OTHER | Age: 71
End: 2021-02-25

## 2021-03-03 DIAGNOSIS — Z23 NEED FOR VACCINATION: ICD-10-CM

## 2021-09-11 ENCOUNTER — HOSPITAL ENCOUNTER (OUTPATIENT)
Dept: RADIOLOGY | Facility: MEDICAL CENTER | Age: 71
End: 2021-09-11
Attending: FAMILY MEDICINE
Payer: COMMERCIAL

## 2021-09-11 DIAGNOSIS — Z12.31 VISIT FOR SCREENING MAMMOGRAM: ICD-10-CM

## 2021-09-11 PROCEDURE — 77063 BREAST TOMOSYNTHESIS BI: CPT

## 2021-12-13 ENCOUNTER — TELEPHONE (OUTPATIENT)
Dept: HEALTH INFORMATION MANAGEMENT | Facility: OTHER | Age: 71
End: 2021-12-13

## 2022-01-06 PROBLEM — M85.80 OSTEOPENIA: Status: ACTIVE | Noted: 2022-01-06

## 2022-02-11 ENCOUNTER — APPOINTMENT (OUTPATIENT)
Dept: MEDICAL GROUP | Facility: PHYSICIAN GROUP | Age: 72
End: 2022-02-11
Payer: MEDICARE

## 2022-02-22 ENCOUNTER — OFFICE VISIT (OUTPATIENT)
Dept: MEDICAL GROUP | Facility: PHYSICIAN GROUP | Age: 72
End: 2022-02-22
Payer: MEDICARE

## 2022-02-22 VITALS
TEMPERATURE: 98.6 F | HEART RATE: 60 BPM | OXYGEN SATURATION: 96 % | DIASTOLIC BLOOD PRESSURE: 70 MMHG | HEIGHT: 63 IN | RESPIRATION RATE: 12 BRPM | SYSTOLIC BLOOD PRESSURE: 114 MMHG | BODY MASS INDEX: 21.83 KG/M2 | WEIGHT: 123.2 LBS

## 2022-02-22 DIAGNOSIS — R53.83 OTHER FATIGUE: ICD-10-CM

## 2022-02-22 DIAGNOSIS — R73.01 IMPAIRED FASTING GLUCOSE: ICD-10-CM

## 2022-02-22 DIAGNOSIS — Z13.220 SCREENING CHOLESTEROL LEVEL: ICD-10-CM

## 2022-02-22 DIAGNOSIS — E55.9 VITAMIN D INSUFFICIENCY: ICD-10-CM

## 2022-02-22 DIAGNOSIS — F51.01 PRIMARY INSOMNIA: ICD-10-CM

## 2022-02-22 DIAGNOSIS — E03.4 HYPOTHYROIDISM DUE TO ACQUIRED ATROPHY OF THYROID: ICD-10-CM

## 2022-02-22 DIAGNOSIS — M85.80 OSTEOPENIA, UNSPECIFIED LOCATION: ICD-10-CM

## 2022-02-22 PROCEDURE — 99215 OFFICE O/P EST HI 40 MIN: CPT | Performed by: INTERNAL MEDICINE

## 2022-02-22 RX ORDER — LEVOTHYROXINE SODIUM 0.05 MG/1
50 TABLET ORAL
Qty: 100 TABLET | Refills: 2 | Status: SHIPPED | OUTPATIENT
Start: 2022-02-22 | End: 2022-12-12

## 2022-02-22 ASSESSMENT — PATIENT HEALTH QUESTIONNAIRE - PHQ9: CLINICAL INTERPRETATION OF PHQ2 SCORE: 0

## 2022-02-22 NOTE — PROGRESS NOTES
CC: Establish medical care.  Previous patient of Dr. Lezama.    HPI:  Елена presents with the following    1. IFG (impaired fasting glucose)  Patient is due for hemoglobin A1c.  Strong family history of diabetes.  Noted intermittent hyperglycemia on lab work.    2. Hypothyroidism due to acquired atrophy of thyroid  Patient requiring refill on her Synthroid 50 MCG's daily.  TSH completed January 2020 was 2.9.  Asymptomatic.    3. Osteopenia, unspecified location  DEXA scan completed in August 2020 which showed mild osteopenia of the left femur with T score -1.1.  Lumbar T score 2.3.  Patient is very active, exercises daily, was a , recently started resistance training.  Takes calcium and vitamin D regularly.    4. Vitamin D insufficiency  Takes over-the-counter calcium and vitamin D supplements.  Vitamin D level due.    5. Primary insomnia  Patient is currently taking melatonin 3 mg tablets at bedtime.  Patient sleeps about 7 to 8 hours nightly.  Restful sleep.  Took trazodone in the past.    6. Screening cholesterol level  History of mild hypertriglyceridemia.  Patient is a vegetarian.  Not on a statin.  Due for lipid panel.  Patient eats high-fiber diet, extra fiber tablets, Suraj seeds.    7. Other fatigue  Patient concerned about vitamin B12 deficiency due to the fact that she has been a vegetarian all her life.      Patient Active Problem List    Diagnosis Date Noted   • Osteopenia 01/06/2022   • BMI 21.0-21.9, adult 01/06/2022   • Decreased GFR 02/28/2019   • Vitamin D insufficiency 09/05/2017   • IFG (impaired fasting glucose) 09/05/2017   • Primary insomnia 09/05/2017   • Hypothyroidism due to acquired atrophy of thyroid 09/04/2017   • Dyslipidemia 07/28/2009   • History of rheumatic fever 06/02/2009   • Skin lesion 06/02/2009   • Overactive bladder 06/02/2009       Current Outpatient Medications   Medication Sig Dispense Refill   • levothyroxine (SYNTHROID) 50 MCG Tab Take 1 Tablet by mouth  every morning on an empty stomach. 100 Tablet 2   • Cholecalciferol (VITAMIN D) 2000 units Cap Take  by mouth.     • traZODone (DESYREL) 50 MG Tab TAKE 1/2 TABS BY MOUTH AT BEDTIME AS NEEDED FOR SLEEP. 15 tablet 0     No current facility-administered medications for this visit.         Allergies as of 2022   • (No Known Allergies)        Social History     Socioeconomic History   • Marital status:      Spouse name: Not on file   • Number of children: Not on file   • Years of education: Not on file   • Highest education level: Not on file   Occupational History   • Occupation: [employer]     Employer: unknown   Tobacco Use   • Smoking status: Former Smoker     Packs/day: 1.00     Quit date: 1972     Years since quittin.4   • Smokeless tobacco: Never Used   • Tobacco comment: only smoked 3 years   Substance and Sexual Activity   • Alcohol use: Yes     Alcohol/week: 1.8 oz     Types: 3 Glasses of wine per week   • Drug use: No   • Sexual activity: Yes     Partners: Male   Other Topics Concern   • Not on file   Social History Narrative   • Not on file     Social Determinants of Health     Financial Resource Strain: Not on file   Food Insecurity: Not on file   Transportation Needs: Not on file   Physical Activity: Not on file   Stress: Not on file   Social Connections: Not on file   Intimate Partner Violence: Not on file   Housing Stability: Not on file       Family History   Problem Relation Age of Onset   • Hypertension Mother    • Diabetes Mother    • Hyperlipidemia Mother    • Cancer Father         throat cancer   • Diabetes Brother    • Hypertension Brother    • Hyperlipidemia Brother    • Diabetes Brother    • Hypertension Brother    • Hyperlipidemia Brother        Past Surgical History:   Procedure Laterality Date   • PRIMARY C SECTION         ROS:  Denies any Headache,Chest pain,  Shortness of breath,  Abdominal pain, Changes of bowel or bladder, Lower ext edema, Fevers, Nights sweats,  "Weight Changes, Focal weakness or numbness.  All other systems are negative.    /70 (BP Location: Left arm, Patient Position: Sitting, BP Cuff Size: Adult)   Pulse 60   Temp 37 °C (98.6 °F) (Temporal)   Resp 12   Ht 1.588 m (5' 2.5\")   Wt 55.9 kg (123 lb 3.2 oz)   SpO2 96%   BMI 22.17 kg/m²      Constitutional: Alert, no distress, well-groomed.  Skin: Warm, dry, good turgor, no rashes in visible areas.  Eye: Equal, round and reactive, conjunctiva clear, lids normal.  ENMT: Lips without lesions, good dentition, moist mucous membranes.  Neck: Trachea midline, no masses, no thyromegaly.  Respiratory: Unlabored respiratory effort, no cough.  Abdomen: Soft, no gross masses.  MSK: Normal gait, moves all extremities.  Neuro: Grossly non-focal. No cranial nerve deficit. Strength and sensation intact.   Psych: Alert and oriented x3, normal affect and mood.        Assessment and Plan.   71 y.o. female presenting with the following.     1. IFG (impaired fasting glucose)    - Comp Metabolic Panel; Future  - HEMOGLOBIN A1C; Future    2. Hypothyroidism due to acquired atrophy of thyroid    - CBC WITH DIFFERENTIAL; Future  - THYROID PANEL WITH TSH  - levothyroxine (SYNTHROID) 50 MCG Tab; Take 1 Tablet by mouth every morning on an empty stomach.  Dispense: 100 Tablet; Refill: 2    3. Osteopenia, unspecified location  Continue weightbearing exercise.  Continue calcium and vitamin D supplementation.  Check vitamin D levels.    4. Vitamin D insufficiency    - VITAMIN D,25 HYDROXY; Future    5. Primary insomnia  Continue melatonin as needed.    6. Screening cholesterol level    - Lipid Profile; Future    7. Other fatigue    - VITAMIN B12; Future  - FOLATE; Future    My total time spent caring for the patient on the day of the encounter was 45 minutes.   This does not include time spent on separately billable procedures/tests.    "

## 2022-03-04 ENCOUNTER — HOSPITAL ENCOUNTER (OUTPATIENT)
Dept: RADIOLOGY | Facility: MEDICAL CENTER | Age: 72
End: 2022-03-04
Attending: NURSE PRACTITIONER
Payer: MEDICARE

## 2022-03-04 ENCOUNTER — OFFICE VISIT (OUTPATIENT)
Dept: URGENT CARE | Facility: CLINIC | Age: 72
End: 2022-03-04
Payer: MEDICARE

## 2022-03-04 ENCOUNTER — TELEPHONE (OUTPATIENT)
Dept: URGENT CARE | Facility: CLINIC | Age: 72
End: 2022-03-04

## 2022-03-04 VITALS
OXYGEN SATURATION: 96 % | BODY MASS INDEX: 21.26 KG/M2 | SYSTOLIC BLOOD PRESSURE: 126 MMHG | WEIGHT: 120 LBS | HEIGHT: 63 IN | TEMPERATURE: 98.1 F | RESPIRATION RATE: 16 BRPM | DIASTOLIC BLOOD PRESSURE: 62 MMHG | HEART RATE: 60 BPM
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
## 2022-03-04 DIAGNOSIS — S09.90XA INJURY OF HEAD, INITIAL ENCOUNTER: ICD-10-CM

## 2022-03-04 DIAGNOSIS — K08.89 TOOTH PAIN: ICD-10-CM

## 2022-03-04 DIAGNOSIS — S19.9XXA INJURY OF NECK, INITIAL ENCOUNTER: ICD-10-CM

## 2022-03-04 PROCEDURE — 70486 CT MAXILLOFACIAL W/O DYE: CPT | Mod: MG

## 2022-03-04 PROCEDURE — 99214 OFFICE O/P EST MOD 30 MIN: CPT | Performed by: NURSE PRACTITIONER

## 2022-03-04 PROCEDURE — 70450 CT HEAD/BRAIN W/O DYE: CPT | Mod: ME

## 2022-03-04 PROCEDURE — 72125 CT NECK SPINE W/O DYE: CPT | Mod: ME

## 2022-03-04 RX ORDER — AMOXICILLIN 500 MG/1
500 CAPSULE ORAL 2 TIMES DAILY
Qty: 20 CAPSULE | Refills: 0 | Status: SHIPPED | OUTPATIENT
Start: 2022-03-04 | End: 2022-03-14

## 2022-03-04 ASSESSMENT — ENCOUNTER SYMPTOMS
PHOTOPHOBIA: 0
EYE REDNESS: 0
SPEECH CHANGE: 0
BLURRED VISION: 0
TINGLING: 0
SENSORY CHANGE: 0
EYE DISCHARGE: 0
WEAKNESS: 0
DIZZINESS: 0
CHILLS: 0
FOCAL WEAKNESS: 0
TREMORS: 0
LOSS OF CONSCIOUSNESS: 0
EYE PAIN: 0
VOMITING: 0
NAUSEA: 0
HEADACHES: 1
SEIZURES: 0
DOUBLE VISION: 0
FEVER: 0
PALPITATIONS: 0
NECK PAIN: 1

## 2022-03-04 NOTE — PROGRESS NOTES
Subjective     Елена San is a 71 y.o. female who presents with Injury (X3days, Neck, head, jaw, upper back, pt fell skiing, )    Past Medical History:   Diagnosis Date   • Cardiac murmur 2009   • Dyslipidemia 2009   • Neoplasm of unspecified nature of bone, soft tissue, and skin 2009   • Overactive bladder 2009   • Skin lesion 2009   • Thyroid disease      Social History     Socioeconomic History   • Marital status:      Spouse name: Not on file   • Number of children: Not on file   • Years of education: Not on file   • Highest education level: Not on file   Occupational History   • Occupation: [employer]     Employer: unknown   Tobacco Use   • Smoking status: Former Smoker     Packs/day: 1.00     Quit date: 1972     Years since quittin.5   • Smokeless tobacco: Never Used   • Tobacco comment: only smoked 3 years   Vaping Use   • Vaping Use: Never used   Substance and Sexual Activity   • Alcohol use: Yes     Alcohol/week: 1.8 oz     Types: 3 Glasses of wine per week     Comment: occ   • Drug use: No   • Sexual activity: Yes     Partners: Male   Other Topics Concern   • Not on file   Social History Narrative   • Not on file     Social Determinants of Health     Financial Resource Strain: Not on file   Food Insecurity: Not on file   Transportation Needs: Not on file   Physical Activity: Not on file   Stress: Not on file   Social Connections: Not on file   Intimate Partner Violence: Not on file   Housing Stability: Not on file     Family History   Problem Relation Age of Onset   • Hypertension Mother    • Diabetes Mother    • Hyperlipidemia Mother    • Cancer Father         throat cancer   • Diabetes Brother    • Hypertension Brother    • Hyperlipidemia Brother    • Diabetes Brother    • Hypertension Brother    • Hyperlipidemia Brother        Allergies: Patient has no known allergies.    Patient is a 71-year-old female who presented with complaint of frontal area headache and  "midline neck pain.  She endorses general malaise and fatigue and soreness across the upper back.  Patient states that 2 days ago she was skiing at [employer] and fell.  She is not sure what caused her to fall, but states she fell forward and hit her forehead on the inside of her helmet and then states that her jaw on the ground as well.  Has been having facial and jaw pain as well as pain to one of her molars.  She states no loss of consciousness.  States she is mildly sore to the upper and lower extremities as well but that is improving.          Other  This is a new problem. Episode onset: 48 hours ago. The problem occurs constantly. Associated symptoms include headaches and neck pain. Pertinent negatives include no chest pain, chills, fever, nausea, vomiting or weakness. Exacerbated by: Movement.       Review of Systems   Constitutional: Negative for chills and fever.   HENT:        Jaw pain, mouth pain   Eyes: Negative for blurred vision, double vision, photophobia, pain, discharge and redness.   Cardiovascular: Negative for chest pain and palpitations.   Gastrointestinal: Negative for nausea and vomiting.   Musculoskeletal: Positive for neck pain.   Neurological: Positive for headaches. Negative for dizziness, tingling, tremors, sensory change, speech change, focal weakness, seizures, loss of consciousness and weakness.              Objective     /62   Pulse 60   Temp 36.7 °C (98.1 °F) (Temporal)   Resp 16   Ht 1.6 m (5' 3\")   Wt 54.4 kg (120 lb)   SpO2 96%   BMI 21.26 kg/m²      Physical Exam  Vitals reviewed.   Constitutional:       Appearance: Normal appearance.   HENT:      Head: Normocephalic and atraumatic.      Jaw: Tenderness present. No trismus, swelling or malocclusion.        Comments: Mild tenderness over the right jaw  Mild tenderness with contusion over the forehead     Right Ear: Tympanic membrane, ear canal and external ear normal.      Left Ear: Tympanic membrane, ear canal and " external ear normal.      Nose: Nose normal.      Mouth/Throat:      Mouth: Mucous membranes are moist.   Eyes:      General: No visual field deficit.     Extraocular Movements: Extraocular movements intact.      Conjunctiva/sclera: Conjunctivae normal.      Pupils: Pupils are equal, round, and reactive to light.   Neck:        Comments: Tenderness over the cervical spine to palpation.  No obvious discoloration, swelling, or deformity, no palpable step-off.  Cardiovascular:      Rate and Rhythm: Normal rate and regular rhythm.      Heart sounds: Normal heart sounds.   Pulmonary:      Effort: Pulmonary effort is normal. No respiratory distress.      Breath sounds: Normal breath sounds. No stridor. No wheezing, rhonchi or rales.   Chest:      Chest wall: No tenderness.   Abdominal:      General: Abdomen is flat.      Palpations: Abdomen is soft.      Tenderness: There is no abdominal tenderness.   Musculoskeletal:      Cervical back: Tenderness present.   Skin:     General: Skin is warm and dry.      Capillary Refill: Capillary refill takes less than 2 seconds.   Neurological:      General: No focal deficit present.      Mental Status: She is alert and oriented to person, place, and time. Mental status is at baseline.      GCS: GCS eye subscore is 4. GCS verbal subscore is 5. GCS motor subscore is 6.      Cranial Nerves: Cranial nerves are intact. No cranial nerve deficit, dysarthria or facial asymmetry.      Sensory: Sensation is intact. No sensory deficit.      Motor: Motor function is intact. No weakness, tremor, atrophy, abnormal muscle tone, seizure activity or pronator drift.      Coordination: Coordination is intact. Romberg sign negative. Coordination normal. Finger-Nose-Finger Test normal. Rapid alternating movements normal.      Gait: Gait is intact. Gait normal.      Comments: Cranial nerves II through XII intact.  Cerebellar function intact.  Motor coordination intact.  Romberg negative, no pronator drift.   Proprioception intact.  Facial features symmetric with equal movement.  Pupils equally round and reactive, EOMs intact.  Speech is clear and logical.  TMs clear.  Shoulder shrug equal bilaterally.   5/5 and equal in the upper extremities.  Strength 5/5 and equal in the upper and lower extremities.  Gait is even and steady.  Sensation intact.  Patient is awake, alert, and oriented to person, place, time, and situation.   Psychiatric:         Mood and Affect: Mood normal.         Behavior: Behavior normal.             CT head: Negative per radiologist    CT maxillofacial: Negative per radiologist    CT C-spine: Negative per radiologist                Assessment & Plan   Head injury and facial injury    Ibuprofen as needed  Rest  ER precautions for increasingly severe headache, visual changes, dizziness, or change in mental status  Recommended follow-up also with patient's primary care physician  May return to urgent care for any further questions or concerns     There are no diagnoses linked to this encounter.

## 2022-03-05 NOTE — TELEPHONE ENCOUNTER
Patient notified by phone of results of CTs.  All are negative.  Patient was advised to continue monitoring for any neurologic changes such as increasing headache visual changes, confusion, mental status changes.  I have advised her to go to ER if she experiences the symptoms.  Patient verbalized understanding and agreement with plan of care.  She may take Tylenol as needed.  I did also advise her to follow-up with her dentist on Monday morning for tooth pain and possible injury.  No further questions at this time May return to urgent care otherwise for any further questions or concerns

## 2022-04-13 ENCOUNTER — PATIENT MESSAGE (OUTPATIENT)
Dept: HEALTH INFORMATION MANAGEMENT | Facility: OTHER | Age: 72
End: 2022-04-13

## 2022-05-04 ENCOUNTER — HOSPITAL ENCOUNTER (OUTPATIENT)
Dept: LAB | Facility: MEDICAL CENTER | Age: 72
End: 2022-05-04
Attending: INTERNAL MEDICINE
Payer: MEDICARE

## 2022-05-04 DIAGNOSIS — E55.9 VITAMIN D INSUFFICIENCY: ICD-10-CM

## 2022-05-04 DIAGNOSIS — E03.4 HYPOTHYROIDISM DUE TO ACQUIRED ATROPHY OF THYROID: ICD-10-CM

## 2022-05-04 DIAGNOSIS — R53.83 OTHER FATIGUE: ICD-10-CM

## 2022-05-04 DIAGNOSIS — R73.01 IMPAIRED FASTING GLUCOSE: ICD-10-CM

## 2022-05-04 DIAGNOSIS — Z13.220 SCREENING CHOLESTEROL LEVEL: ICD-10-CM

## 2022-05-04 LAB
25(OH)D3 SERPL-MCNC: 71 NG/ML (ref 30–100)
ALBUMIN SERPL BCP-MCNC: 4.3 G/DL (ref 3.2–4.9)
ALBUMIN/GLOB SERPL: 1.7 G/DL
ALP SERPL-CCNC: 40 U/L (ref 30–99)
ALT SERPL-CCNC: 15 U/L (ref 2–50)
ANION GAP SERPL CALC-SCNC: 11 MMOL/L (ref 7–16)
AST SERPL-CCNC: 22 U/L (ref 12–45)
BASOPHILS # BLD AUTO: 0.9 % (ref 0–1.8)
BASOPHILS # BLD: 0.04 K/UL (ref 0–0.12)
BILIRUB SERPL-MCNC: 0.6 MG/DL (ref 0.1–1.5)
BUN SERPL-MCNC: 12 MG/DL (ref 8–22)
CALCIUM SERPL-MCNC: 9.5 MG/DL (ref 8.4–10.2)
CHLORIDE SERPL-SCNC: 101 MMOL/L (ref 96–112)
CHOLEST SERPL-MCNC: 206 MG/DL (ref 100–199)
CO2 SERPL-SCNC: 26 MMOL/L (ref 20–33)
CREAT SERPL-MCNC: 0.84 MG/DL (ref 0.5–1.4)
EOSINOPHIL # BLD AUTO: 0.12 K/UL (ref 0–0.51)
EOSINOPHIL NFR BLD: 2.7 % (ref 0–6.9)
ERYTHROCYTE [DISTWIDTH] IN BLOOD BY AUTOMATED COUNT: 45.1 FL (ref 35.9–50)
EST. AVERAGE GLUCOSE BLD GHB EST-MCNC: 97 MG/DL
FASTING STATUS PATIENT QL REPORTED: NORMAL
FOLATE SERPL-MCNC: 38.6 NG/ML
GFR SERPLBLD CREATININE-BSD FMLA CKD-EPI: 75 ML/MIN/1.73 M 2
GLOBULIN SER CALC-MCNC: 2.5 G/DL (ref 1.9–3.5)
GLUCOSE SERPL-MCNC: 92 MG/DL (ref 65–99)
HBA1C MFR BLD: 5 % (ref 4–5.6)
HCT VFR BLD AUTO: 42.4 % (ref 37–47)
HDLC SERPL-MCNC: 74 MG/DL
HGB BLD-MCNC: 14.4 G/DL (ref 12–16)
IMM GRANULOCYTES # BLD AUTO: 0.01 K/UL (ref 0–0.11)
IMM GRANULOCYTES NFR BLD AUTO: 0.2 % (ref 0–0.9)
LDLC SERPL CALC-MCNC: 110 MG/DL
LYMPHOCYTES # BLD AUTO: 1.27 K/UL (ref 1–4.8)
LYMPHOCYTES NFR BLD: 28.5 % (ref 22–41)
MCH RBC QN AUTO: 33.1 PG (ref 27–33)
MCHC RBC AUTO-ENTMCNC: 34 G/DL (ref 33.6–35)
MCV RBC AUTO: 97.5 FL (ref 81.4–97.8)
MONOCYTES # BLD AUTO: 0.39 K/UL (ref 0–0.85)
MONOCYTES NFR BLD AUTO: 8.7 % (ref 0–13.4)
NEUTROPHILS # BLD AUTO: 2.63 K/UL (ref 2–7.15)
NEUTROPHILS NFR BLD: 59 % (ref 44–72)
NRBC # BLD AUTO: 0 K/UL
NRBC BLD-RTO: 0 /100 WBC
PLATELET # BLD AUTO: 320 K/UL (ref 164–446)
PMV BLD AUTO: 9 FL (ref 9–12.9)
POTASSIUM SERPL-SCNC: 3.8 MMOL/L (ref 3.6–5.5)
PROT SERPL-MCNC: 6.8 G/DL (ref 6–8.2)
RBC # BLD AUTO: 4.35 M/UL (ref 4.2–5.4)
SODIUM SERPL-SCNC: 138 MMOL/L (ref 135–145)
T4 FREE SERPL-MCNC: 1.37 NG/DL (ref 0.93–1.7)
TRIGL SERPL-MCNC: 110 MG/DL (ref 0–149)
TSH SERPL DL<=0.005 MIU/L-ACNC: 3.31 UIU/ML (ref 0.38–5.33)
VIT B12 SERPL-MCNC: 1245 PG/ML (ref 211–911)
WBC # BLD AUTO: 4.5 K/UL (ref 4.8–10.8)

## 2022-05-04 PROCEDURE — 83036 HEMOGLOBIN GLYCOSYLATED A1C: CPT

## 2022-05-04 PROCEDURE — 82306 VITAMIN D 25 HYDROXY: CPT

## 2022-05-04 PROCEDURE — 82746 ASSAY OF FOLIC ACID SERUM: CPT

## 2022-05-04 PROCEDURE — 84443 ASSAY THYROID STIM HORMONE: CPT

## 2022-05-04 PROCEDURE — 85025 COMPLETE CBC W/AUTO DIFF WBC: CPT

## 2022-05-04 PROCEDURE — 82607 VITAMIN B-12: CPT

## 2022-05-04 PROCEDURE — 80061 LIPID PANEL: CPT

## 2022-05-04 PROCEDURE — 80053 COMPREHEN METABOLIC PANEL: CPT

## 2022-05-04 PROCEDURE — 84439 ASSAY OF FREE THYROXINE: CPT

## 2022-05-04 PROCEDURE — 36415 COLL VENOUS BLD VENIPUNCTURE: CPT

## 2022-06-08 ENCOUNTER — OFFICE VISIT (OUTPATIENT)
Dept: MEDICAL GROUP | Facility: PHYSICIAN GROUP | Age: 72
End: 2022-06-08
Payer: MEDICARE

## 2022-06-08 VITALS
RESPIRATION RATE: 15 BRPM | HEIGHT: 63 IN | DIASTOLIC BLOOD PRESSURE: 60 MMHG | WEIGHT: 121.1 LBS | SYSTOLIC BLOOD PRESSURE: 120 MMHG | HEART RATE: 74 BPM | BODY MASS INDEX: 21.46 KG/M2 | TEMPERATURE: 98.4 F | OXYGEN SATURATION: 96 %

## 2022-06-08 DIAGNOSIS — E03.4 HYPOTHYROIDISM DUE TO ACQUIRED ATROPHY OF THYROID: ICD-10-CM

## 2022-06-08 DIAGNOSIS — M77.51 BONE SPUR OF RIGHT FOOT: ICD-10-CM

## 2022-06-08 DIAGNOSIS — E78.5 DYSLIPIDEMIA: Chronic | ICD-10-CM

## 2022-06-08 DIAGNOSIS — R73.01 IMPAIRED FASTING GLUCOSE: ICD-10-CM

## 2022-06-08 DIAGNOSIS — Z12.83 SCREENING EXAM FOR SKIN CANCER: ICD-10-CM

## 2022-06-08 DIAGNOSIS — M54.9 UPPER BACK PAIN: ICD-10-CM

## 2022-06-08 DIAGNOSIS — Z12.4 SCREENING FOR CERVICAL CANCER: ICD-10-CM

## 2022-06-08 PROBLEM — R94.4 DECREASED GFR: Status: RESOLVED | Noted: 2019-02-28 | Resolved: 2022-06-08

## 2022-06-08 PROCEDURE — 99214 OFFICE O/P EST MOD 30 MIN: CPT | Performed by: INTERNAL MEDICINE

## 2022-06-08 RX ORDER — AMOXICILLIN 500 MG/1
CAPSULE ORAL
COMMUNITY
Start: 2022-03-23

## 2022-06-08 ASSESSMENT — FIBROSIS 4 INDEX: FIB4 SCORE: 1.22

## 2022-06-08 NOTE — PROGRESS NOTES
CC: Follow-up lab work.    HPI:  Елена presents with the following    1. Dyslipidemia  Total cholesterol 206, up from 197.  Triglycerides 110, HDL 74, .  Patient is not taking a statin medication.  Patient eats a very healthy diet, mostly vegetarian.    2. Hypothyroidism due to acquired atrophy of thyroid  Patient is currently taking Synthroid 50 MCG's daily.  TSH 3.3.  Asymptomatic.    3. IFG (impaired fasting glucose)  Patient is concerned about family history of diabetes.  Hemoglobin A1c 5.0.  Managed through diet and lifestyle modifications.    4. Bone spur of right foot  Patient is very active and hiking and pickle ball but has noticed increased pain in her right foot.  She has a history of plantar fasciitis and bone spurs.  Patient would like to follow-up with her podiatrist.  She has been using arch supports.    5. Upper back pain  Patient complains of mid upper back pain/muscle spasms.  Patient had a skiing accident in March and has been painful ever since.    6. Screening exam for skin cancer  Requesting referral to dermatology.  History of sunburns living in [place].  Patient using sunscreen.    7. Screening for cervical cancer  Patient will be due for her Pap smear and is requesting referral to gynecology.      Patient Active Problem List    Diagnosis Date Noted   • Bone spur of right foot 06/08/2022   • Upper back pain 06/08/2022   • Osteopenia 01/06/2022   • BMI 21.0-21.9, adult 01/06/2022   • Decreased GFR 02/28/2019   • Vitamin D insufficiency 09/05/2017   • IFG (impaired fasting glucose) 09/05/2017   • Primary insomnia 09/05/2017   • Hypothyroidism due to acquired atrophy of thyroid 09/04/2017   • Dyslipidemia 07/28/2009   • Cardiac murmur 07/28/2009   • History of rheumatic fever 06/02/2009   • Skin lesion 06/02/2009   • Overactive bladder 06/02/2009       Current Outpatient Medications   Medication Sig Dispense Refill   • levothyroxine (SYNTHROID) 50 MCG Tab Take 1 Tablet by mouth every  morning on an empty stomach. 100 Tablet 2   • Cholecalciferol (VITAMIN D) 2000 units Cap Take  by mouth.     • amoxicillin (AMOXIL) 500 MG Cap TAKE 1 CAPSULE BY MOUTH FOUR TIMES A DAY UNTIL FINISHED       No current facility-administered medications for this visit.         Allergies as of 2022   • (No Known Allergies)        Social History     Socioeconomic History   • Marital status:      Spouse name: Not on file   • Number of children: Not on file   • Years of education: Not on file   • Highest education level: Not on file   Occupational History   • Occupation: [employer]     Employer: unknown   Tobacco Use   • Smoking status: Former Smoker     Packs/day: 1.00     Quit date: 1972     Years since quittin.7   • Smokeless tobacco: Never Used   • Tobacco comment: only smoked 3 years   Vaping Use   • Vaping Use: Never used   Substance and Sexual Activity   • Alcohol use: Yes     Alcohol/week: 1.8 oz     Types: 3 Glasses of wine per week     Comment: occ   • Drug use: No   • Sexual activity: Yes     Partners: Male   Other Topics Concern   • Not on file   Social History Narrative   • Not on file     Social Determinants of Health     Financial Resource Strain: Not on file   Food Insecurity: Not on file   Transportation Needs: Not on file   Physical Activity: Not on file   Stress: Not on file   Social Connections: Not on file   Intimate Partner Violence: Not on file   Housing Stability: Not on file       Family History   Problem Relation Age of Onset   • Hypertension Mother    • Diabetes Mother    • Hyperlipidemia Mother    • Cancer Father         throat cancer   • Diabetes Brother    • Hypertension Brother    • Hyperlipidemia Brother    • Diabetes Brother    • Hypertension Brother    • Hyperlipidemia Brother        Past Surgical History:   Procedure Laterality Date   • PRIMARY C SECTION         ROS: Positive ROS per HPI.  Denies any Headache,Chest pain,  Shortness of breath,  Abdominal pain,  "Changes of bowel or bladder, Lower ext edema, Fevers, Nights sweats, Weight Changes, Focal weakness or numbness.  All other systems are negative.    /60 (BP Location: Left arm, Patient Position: Sitting, BP Cuff Size: Adult)   Pulse 74   Temp 36.9 °C (98.4 °F) (Temporal)   Resp 15   Ht 1.6 m (5' 3\")   Wt 54.9 kg (121 lb 1.6 oz)   SpO2 96%   BMI 21.45 kg/m²      Constitutional: Alert, no distress, well-groomed.  Skin: Warm, dry, good turgor, no rashes in visible areas.  Eye: Equal, round and reactive, conjunctiva clear, lids normal.  ENMT: Lips without lesions, good dentition, moist mucous membranes.  Neck: Trachea midline, no masses, no thyromegaly.  Respiratory: Unlabored respiratory effort, no cough.  Abdomen: Soft, no gross masses.  MSK: Normal gait, moves all extremities.  Neuro: Grossly non-focal. No cranial nerve deficit. Strength and sensation intact.   Psych: Alert and oriented x3, normal affect and mood.      Assessment and Plan.   71 y.o. female presenting with the following.     1. Dyslipidemia  Stable.  Declines statin.  Continue diet and lifestyle modifications.    2. Hypothyroidism due to acquired atrophy of thyroid  Stable.  Continue current dose of levothyroxine    3. IFG (impaired fasting glucose)  Stable.  Diet and lifestyle modifications.    4. Bone spur of right foot  Patient will call Dr. Dykes's office for an appointment.    5. Upper back pain  Recommend heat massage therapy.  Patient declines muscle relaxers.  Consider physical therapy.    6. Screening exam for skin cancer    - Referral to Dermatology    7. Screening for cervical cancer    - Referral to Gynecology    My total time spent caring for the patient on the day of the encounter was 32 minutes.   This does not include time spent on separately billable procedures/tests.    "

## 2022-09-14 ENCOUNTER — HOSPITAL ENCOUNTER (OUTPATIENT)
Dept: RADIOLOGY | Facility: MEDICAL CENTER | Age: 72
End: 2022-09-14
Attending: INTERNAL MEDICINE
Payer: MEDICARE

## 2022-09-14 DIAGNOSIS — Z12.31 VISIT FOR SCREENING MAMMOGRAM: ICD-10-CM

## 2022-09-14 PROCEDURE — 77063 BREAST TOMOSYNTHESIS BI: CPT

## 2022-09-16 ENCOUNTER — APPOINTMENT (RX ONLY)
Dept: URBAN - METROPOLITAN AREA CLINIC 35 | Facility: CLINIC | Age: 72
Setting detail: DERMATOLOGY
End: 2022-09-16

## 2022-09-16 DIAGNOSIS — L21.8 OTHER SEBORRHEIC DERMATITIS: ICD-10-CM

## 2022-09-16 DIAGNOSIS — L81.4 OTHER MELANIN HYPERPIGMENTATION: ICD-10-CM

## 2022-09-16 DIAGNOSIS — D22 MELANOCYTIC NEVI: ICD-10-CM

## 2022-09-16 DIAGNOSIS — Z71.89 OTHER SPECIFIED COUNSELING: ICD-10-CM

## 2022-09-16 DIAGNOSIS — L82.1 OTHER SEBORRHEIC KERATOSIS: ICD-10-CM

## 2022-09-16 PROBLEM — D22.5 MELANOCYTIC NEVI OF TRUNK: Status: ACTIVE | Noted: 2022-09-16

## 2022-09-16 PROCEDURE — 99203 OFFICE O/P NEW LOW 30 MIN: CPT

## 2022-09-16 PROCEDURE — ? SUNSCREEN TREATMENT REGIMEN

## 2022-09-16 PROCEDURE — ? COUNSELING

## 2022-09-16 ASSESSMENT — LOCATION DETAILED DESCRIPTION DERM
LOCATION DETAILED: RIGHT FOREHEAD
LOCATION DETAILED: SUPERIOR THORACIC SPINE
LOCATION DETAILED: LEFT VENTRAL PROXIMAL FOREARM
LOCATION DETAILED: EPIGASTRIC SKIN
LOCATION DETAILED: RIGHT VENTRAL PROXIMAL FOREARM
LOCATION DETAILED: INFERIOR MID FOREHEAD
LOCATION DETAILED: RIGHT MEDIAL FRONTAL SCALP

## 2022-09-16 ASSESSMENT — LOCATION SIMPLE DESCRIPTION DERM
LOCATION SIMPLE: LEFT FOREARM
LOCATION SIMPLE: UPPER BACK
LOCATION SIMPLE: ABDOMEN
LOCATION SIMPLE: RIGHT FOREARM
LOCATION SIMPLE: INFERIOR FOREHEAD
LOCATION SIMPLE: RIGHT FOREHEAD
LOCATION SIMPLE: RIGHT SCALP

## 2022-09-16 ASSESSMENT — LOCATION ZONE DERM
LOCATION ZONE: FACE
LOCATION ZONE: SCALP
LOCATION ZONE: ARM
LOCATION ZONE: TRUNK

## 2022-09-16 NOTE — PROCEDURE: MIPS QUALITY
Quality 110: Preventive Care And Screening: Influenza Immunization: Influenza Immunization Administered during Influenza season
Detail Level: Detailed
Quality 111:Pneumonia Vaccination Status For Older Adults: Pneumococcal vaccine (PPSV23) administered on or after patient’s 60th birthday and before the end of the measurement period
Quality 226: Preventive Care And Screening: Tobacco Use: Screening And Cessation Intervention: Patient screened for tobacco use and is an ex/non-smoker
Quality 130: Documentation Of Current Medications In The Medical Record: Current Medications Documented

## 2022-10-13 NOTE — TELEPHONE ENCOUNTER
----- Message from ROMELIA Eduardo sent at 6/5/2019  7:16 AM PDT -----  Please notify patient that her nonfasting kidney function tests were normal.    ROMELIA Eduardo     21.1

## 2022-11-29 ENCOUNTER — DOCUMENTATION (OUTPATIENT)
Dept: HEALTH INFORMATION MANAGEMENT | Facility: OTHER | Age: 72
End: 2022-11-29
Payer: MEDICARE

## 2023-01-03 PROBLEM — H26.9 CATARACT: Status: ACTIVE | Noted: 2023-01-03

## 2023-01-03 PROBLEM — Z85.828 HISTORY OF SKIN CANCER: Status: ACTIVE | Noted: 2023-01-03

## 2023-01-11 DIAGNOSIS — R35.0 URINARY FREQUENCY: ICD-10-CM

## 2023-01-11 DIAGNOSIS — E78.5 DYSLIPIDEMIA: ICD-10-CM

## 2023-01-11 DIAGNOSIS — E55.9 VITAMIN D DEFICIENCY: ICD-10-CM

## 2023-01-11 DIAGNOSIS — E03.9 ACQUIRED HYPOTHYROIDISM: ICD-10-CM

## 2023-05-18 ENCOUNTER — APPOINTMENT (RX ONLY)
Dept: URBAN - METROPOLITAN AREA CLINIC 15 | Facility: CLINIC | Age: 73
Setting detail: DERMATOLOGY
End: 2023-05-18

## 2023-05-18 DIAGNOSIS — L81.4 OTHER MELANIN HYPERPIGMENTATION: ICD-10-CM

## 2023-05-18 DIAGNOSIS — L82.1 OTHER SEBORRHEIC KERATOSIS: ICD-10-CM

## 2023-05-18 DIAGNOSIS — M67.4 GANGLION: ICD-10-CM

## 2023-05-18 DIAGNOSIS — Z71.89 OTHER SPECIFIED COUNSELING: ICD-10-CM

## 2023-05-18 DIAGNOSIS — D22 MELANOCYTIC NEVI: ICD-10-CM

## 2023-05-18 PROBLEM — D22.5 MELANOCYTIC NEVI OF TRUNK: Status: ACTIVE | Noted: 2023-05-18

## 2023-05-18 PROBLEM — D22.0 MELANOCYTIC NEVI OF LIP: Status: ACTIVE | Noted: 2023-05-18

## 2023-05-18 PROBLEM — M67.40 GANGLION, UNSPECIFIED SITE: Status: ACTIVE | Noted: 2023-05-18

## 2023-05-18 PROCEDURE — 99213 OFFICE O/P EST LOW 20 MIN: CPT

## 2023-05-18 PROCEDURE — ? SUNSCREEN TREATMENT REGIMEN

## 2023-05-18 PROCEDURE — ? OBSERVATION

## 2023-05-18 PROCEDURE — ? COUNSELING

## 2023-05-18 ASSESSMENT — LOCATION SIMPLE DESCRIPTION DERM
LOCATION SIMPLE: RIGHT LIP
LOCATION SIMPLE: CHEST
LOCATION SIMPLE: INFERIOR FOREHEAD
LOCATION SIMPLE: RIGHT SHOULDER
LOCATION SIMPLE: UPPER BACK
LOCATION SIMPLE: RIGHT PRETIBIAL REGION
LOCATION SIMPLE: LEFT FOREARM
LOCATION SIMPLE: RIGHT FOREARM

## 2023-05-18 ASSESSMENT — LOCATION DETAILED DESCRIPTION DERM
LOCATION DETAILED: SUPERIOR THORACIC SPINE
LOCATION DETAILED: UPPER STERNUM
LOCATION DETAILED: RIGHT POSTERIOR SHOULDER
LOCATION DETAILED: INFERIOR MID FOREHEAD
LOCATION DETAILED: LEFT VENTRAL PROXIMAL FOREARM
LOCATION DETAILED: RIGHT VENTRAL PROXIMAL FOREARM
LOCATION DETAILED: RIGHT UPPER CUTANEOUS LIP
LOCATION DETAILED: RIGHT DISTAL PRETIBIAL REGION
LOCATION DETAILED: INFERIOR THORACIC SPINE

## 2023-05-18 ASSESSMENT — LOCATION ZONE DERM
LOCATION ZONE: TRUNK
LOCATION ZONE: LEG
LOCATION ZONE: FACE
LOCATION ZONE: ARM
LOCATION ZONE: LIP

## 2023-05-18 NOTE — HPI: FULL BODY SKIN EXAMINATION
How Severe Are Your Spot(S)?: mild
What Is The Reason For Today's Visit?: Full Body Skin Examination
What Is The Reason For Today's Visit? (Being Monitored For X): concerning skin lesions on an annual basis
enhanced independence/views problems comprehensively/secure in body image/gender role/effective coping strategies/practices good health habits/effective social interaction skills

## 2023-06-07 ENCOUNTER — HOSPITAL ENCOUNTER (OUTPATIENT)
Dept: LAB | Facility: MEDICAL CENTER | Age: 73
End: 2023-06-07
Attending: INTERNAL MEDICINE
Payer: MEDICARE

## 2023-06-07 DIAGNOSIS — E03.9 ACQUIRED HYPOTHYROIDISM: ICD-10-CM

## 2023-06-07 DIAGNOSIS — R35.0 URINARY FREQUENCY: ICD-10-CM

## 2023-06-07 DIAGNOSIS — E78.5 DYSLIPIDEMIA: ICD-10-CM

## 2023-06-07 DIAGNOSIS — E55.9 VITAMIN D DEFICIENCY: ICD-10-CM

## 2023-06-07 LAB
25(OH)D3 SERPL-MCNC: 82 NG/ML (ref 30–100)
ALBUMIN SERPL BCP-MCNC: 4.5 G/DL (ref 3.2–4.9)
ALBUMIN/GLOB SERPL: 1.8 G/DL
ALP SERPL-CCNC: 41 U/L (ref 30–99)
ALT SERPL-CCNC: 18 U/L (ref 2–50)
ANION GAP SERPL CALC-SCNC: 11 MMOL/L (ref 7–16)
APPEARANCE UR: CLEAR
AST SERPL-CCNC: 20 U/L (ref 12–45)
BASOPHILS # BLD AUTO: 0.5 % (ref 0–1.8)
BASOPHILS # BLD: 0.02 K/UL (ref 0–0.12)
BILIRUB SERPL-MCNC: 0.5 MG/DL (ref 0.1–1.5)
BILIRUB UR QL STRIP.AUTO: NEGATIVE
BUN SERPL-MCNC: 17 MG/DL (ref 8–22)
CALCIUM ALBUM COR SERPL-MCNC: 8.8 MG/DL (ref 8.5–10.5)
CALCIUM SERPL-MCNC: 9.2 MG/DL (ref 8.5–10.5)
CHLORIDE SERPL-SCNC: 102 MMOL/L (ref 96–112)
CHOLEST SERPL-MCNC: 216 MG/DL (ref 100–199)
CO2 SERPL-SCNC: 26 MMOL/L (ref 20–33)
COLOR UR: YELLOW
CREAT SERPL-MCNC: 0.83 MG/DL (ref 0.5–1.4)
EOSINOPHIL # BLD AUTO: 0.2 K/UL (ref 0–0.51)
EOSINOPHIL NFR BLD: 4.9 % (ref 0–6.9)
ERYTHROCYTE [DISTWIDTH] IN BLOOD BY AUTOMATED COUNT: 46.3 FL (ref 35.9–50)
FASTING STATUS PATIENT QL REPORTED: NORMAL
GFR SERPLBLD CREATININE-BSD FMLA CKD-EPI: 75 ML/MIN/1.73 M 2
GLOBULIN SER CALC-MCNC: 2.5 G/DL (ref 1.9–3.5)
GLUCOSE SERPL-MCNC: 85 MG/DL (ref 65–99)
GLUCOSE UR STRIP.AUTO-MCNC: NEGATIVE MG/DL
HCT VFR BLD AUTO: 43.9 % (ref 37–47)
HDLC SERPL-MCNC: 81 MG/DL
HGB BLD-MCNC: 14.6 G/DL (ref 12–16)
IMM GRANULOCYTES # BLD AUTO: 0.01 K/UL (ref 0–0.11)
IMM GRANULOCYTES NFR BLD AUTO: 0.2 % (ref 0–0.9)
KETONES UR STRIP.AUTO-MCNC: NEGATIVE MG/DL
LDLC SERPL CALC-MCNC: 119 MG/DL
LEUKOCYTE ESTERASE UR QL STRIP.AUTO: NEGATIVE
LYMPHOCYTES # BLD AUTO: 1.72 K/UL (ref 1–4.8)
LYMPHOCYTES NFR BLD: 42.2 % (ref 22–41)
MCH RBC QN AUTO: 32.3 PG (ref 27–33)
MCHC RBC AUTO-ENTMCNC: 33.3 G/DL (ref 32.2–35.5)
MCV RBC AUTO: 97.1 FL (ref 81.4–97.8)
MICRO URNS: NORMAL
MONOCYTES # BLD AUTO: 0.35 K/UL (ref 0–0.85)
MONOCYTES NFR BLD AUTO: 8.6 % (ref 0–13.4)
NEUTROPHILS # BLD AUTO: 1.78 K/UL (ref 1.82–7.42)
NEUTROPHILS NFR BLD: 43.6 % (ref 44–72)
NITRITE UR QL STRIP.AUTO: NEGATIVE
NRBC # BLD AUTO: 0 K/UL
NRBC BLD-RTO: 0 /100 WBC (ref 0–0.2)
PH UR STRIP.AUTO: 6.5 [PH] (ref 5–8)
PLATELET # BLD AUTO: 313 K/UL (ref 164–446)
PMV BLD AUTO: 9.8 FL (ref 9–12.9)
POTASSIUM SERPL-SCNC: 3.9 MMOL/L (ref 3.6–5.5)
PROT SERPL-MCNC: 7 G/DL (ref 6–8.2)
PROT UR QL STRIP: NEGATIVE MG/DL
RBC # BLD AUTO: 4.52 M/UL (ref 4.2–5.4)
RBC UR QL AUTO: NEGATIVE
SODIUM SERPL-SCNC: 139 MMOL/L (ref 135–145)
SP GR UR STRIP.AUTO: 1.02
TRIGL SERPL-MCNC: 82 MG/DL (ref 0–149)
TSH SERPL DL<=0.005 MIU/L-ACNC: 4.01 UIU/ML (ref 0.38–5.33)
UROBILINOGEN UR STRIP.AUTO-MCNC: 0.2 MG/DL
WBC # BLD AUTO: 4.1 K/UL (ref 4.8–10.8)

## 2023-06-07 PROCEDURE — 82306 VITAMIN D 25 HYDROXY: CPT

## 2023-06-07 PROCEDURE — 80061 LIPID PANEL: CPT

## 2023-06-07 PROCEDURE — 84443 ASSAY THYROID STIM HORMONE: CPT

## 2023-06-07 PROCEDURE — 36415 COLL VENOUS BLD VENIPUNCTURE: CPT

## 2023-06-07 PROCEDURE — 81003 URINALYSIS AUTO W/O SCOPE: CPT

## 2023-06-07 PROCEDURE — 80053 COMPREHEN METABOLIC PANEL: CPT

## 2023-06-07 PROCEDURE — 85025 COMPLETE CBC W/AUTO DIFF WBC: CPT

## 2023-06-14 ENCOUNTER — OFFICE VISIT (OUTPATIENT)
Dept: MEDICAL GROUP | Facility: PHYSICIAN GROUP | Age: 73
End: 2023-06-14
Payer: MEDICARE

## 2023-06-14 VITALS
RESPIRATION RATE: 18 BRPM | OXYGEN SATURATION: 96 % | HEIGHT: 63 IN | TEMPERATURE: 99.1 F | SYSTOLIC BLOOD PRESSURE: 92 MMHG | HEART RATE: 59 BPM | DIASTOLIC BLOOD PRESSURE: 50 MMHG | BODY MASS INDEX: 22.06 KG/M2 | WEIGHT: 124.5 LBS

## 2023-06-14 DIAGNOSIS — E03.4 HYPOTHYROIDISM DUE TO ACQUIRED ATROPHY OF THYROID: ICD-10-CM

## 2023-06-14 DIAGNOSIS — E78.5 DYSLIPIDEMIA: Chronic | ICD-10-CM

## 2023-06-14 DIAGNOSIS — H25.013 CORTICAL AGE-RELATED CATARACT OF BOTH EYES: ICD-10-CM

## 2023-06-14 PROBLEM — H25.9 AGE-RELATED CATARACT OF BOTH EYES: Status: ACTIVE | Noted: 2023-06-14

## 2023-06-14 PROCEDURE — 3078F DIAST BP <80 MM HG: CPT | Performed by: INTERNAL MEDICINE

## 2023-06-14 PROCEDURE — 3074F SYST BP LT 130 MM HG: CPT | Performed by: INTERNAL MEDICINE

## 2023-06-14 PROCEDURE — 99214 OFFICE O/P EST MOD 30 MIN: CPT | Performed by: INTERNAL MEDICINE

## 2023-06-14 ASSESSMENT — FIBROSIS 4 INDEX: FIB4 SCORE: 1.05

## 2023-06-14 NOTE — PROGRESS NOTES
CC: Follow-up lab work, dyslipidemia.    HPI:  Елена presents with the following    1. Dyslipidemia  Total cholesterol 216, slightly up from 206.  .  Triglyceride 82 and HDL 81.  Patient is not on a statin.  Patient keeps very physically fit, works out at the gym 5 to 6 days a week.  He eats a Mediterranean diet and does not consume meat.  She does not have a family history of early heart disease however is concerned about her slightly elevated cholesterol levels and heart disease.    2. Hypothyroidism due to acquired atrophy of thyroid  Patient currently taking Synthroid 50 mcg daily.  Asymptomatic.  TSH level 4.0.    3. Cortical age-related cataract of both eyes  Patient recently underwent bilateral cataract surgery with Dr. Delacruz and is doing very well.      Patient Active Problem List    Diagnosis Date Noted    Age-related cataract of both eyes 06/14/2023    Cataract 01/03/2023    History of skin cancer 01/03/2023    Bone spur of right foot 06/08/2022    Upper back pain 06/08/2022    Osteopenia 01/06/2022    BMI 21.0-21.9, adult 01/06/2022    Vitamin D insufficiency 09/05/2017    IFG (impaired fasting glucose) 09/05/2017    Primary insomnia 09/05/2017    Hypothyroidism due to acquired atrophy of thyroid 09/04/2017    Dyslipidemia 07/28/2009    Cardiac murmur 07/28/2009    History of rheumatic fever 06/02/2009    Skin lesion 06/02/2009    Overactive bladder 06/02/2009       Current Outpatient Medications   Medication Sig Dispense Refill    calcium carbonate (OS-AGUSTINA 500) 1250 (500 Ca) MG Tab Take 500 mg by mouth every day.      multivitamin Tab Take 1 Tablet by mouth every day.      levothyroxine (SYNTHROID) 50 MCG Tab TAKE 1 TABLET BY MOUTH EVERY DAY IN THE MORNING ON AN EMPTY STOMACH 100 Tablet 1    Cholecalciferol (VITAMIN D) 2000 units Cap Take  by mouth.      amoxicillin (AMOXIL) 500 MG Cap TAKE 1 CAPSULE BY MOUTH FOUR TIMES A DAY UNTIL FINISHED       No current facility-administered medications for  this visit.         Allergies as of 2023    (No Known Allergies)        Social History     Socioeconomic History    Marital status:      Spouse name: Not on file    Number of children: Not on file    Years of education: Not on file    Highest education level: Not on file   Occupational History    Occupation: [employer]     Employer: unknown   Tobacco Use    Smoking status: Former     Packs/day: 1.00     Types: Cigarettes     Quit date: 1972     Years since quittin.8    Smokeless tobacco: Never    Tobacco comments:     only smoked 3 years   Vaping Use    Vaping Use: Never used   Substance and Sexual Activity    Alcohol use: Yes     Alcohol/week: 1.8 oz     Types: 3 Glasses of wine per week     Comment: occ    Drug use: No    Sexual activity: Yes     Partners: Male   Other Topics Concern    Not on file   Social History Narrative    Not on file     Social Determinants of Health     Financial Resource Strain: Not on file   Food Insecurity: Not on file   Transportation Needs: Not on file   Physical Activity: Not on file   Stress: Not on file   Social Connections: Not on file   Intimate Partner Violence: Not on file   Housing Stability: Not on file       Family History   Problem Relation Age of Onset    Hypertension Mother     Diabetes Mother     Hyperlipidemia Mother     Cancer Father         throat cancer    Diabetes Brother     Hypertension Brother     Hyperlipidemia Brother     Diabetes Brother     Hypertension Brother     Hyperlipidemia Brother        Past Surgical History:   Procedure Laterality Date    PRIMARY C SECTION         ROS:  Denies any Headache,Chest pain,  Shortness of breath,  Abdominal pain, Changes of bowel or bladder, Lower ext edema, Fevers, Nights sweats, Weight Changes, Focal weakness or numbness.  All other systems are negative.    BP 92/50 (BP Location: Left arm, Patient Position: Sitting, BP Cuff Size: Small adult)   Pulse (!) 59   Temp 37.3 °C (99.1 °F) (Temporal)    "Resp 18   Ht 1.6 m (5' 3\")   Wt 56.5 kg (124 lb 8 oz)   SpO2 96%   BMI 22.05 kg/m²      Constitutional: Alert, no distress, well-groomed.  Skin: Warm, dry, good turgor, no rashes in visible areas.  Eye: Equal, round and reactive, conjunctiva clear, lids normal.  ENMT: Lips without lesions, good dentition, moist mucous membranes.  Neck: Trachea midline, no masses, no thyromegaly.  Respiratory: Unlabored respiratory effort, no cough.  Abdomen: Soft, no gross masses.  MSK: Normal gait, moves all extremities.  Neuro: Grossly non-focal. No cranial nerve deficit. Strength and sensation intact.   Psych: Alert and oriented x3, normal affect and mood.    Assessment and Plan.   72 y.o. female presenting with the following.     1. Dyslipidemia  Continue diet lifestyle modifications.  - CT-HEART W/O CONT EVAL CALCIUM; Future    2. Hypothyroidism due to acquired atrophy of thyroid  Continue Synthroid 50 mcg daily.    3. Cortical age-related cataract of both eyes  Stable      My total time spent caring for the patient on the day of the encounter was 30 minutes.   This does not include time spent on separately billable procedures/tests.      "

## 2023-06-28 ENCOUNTER — HOSPITAL ENCOUNTER (OUTPATIENT)
Dept: RADIOLOGY | Facility: MEDICAL CENTER | Age: 73
End: 2023-06-28
Attending: INTERNAL MEDICINE
Payer: COMMERCIAL

## 2023-06-28 DIAGNOSIS — E78.5 DYSLIPIDEMIA: ICD-10-CM

## 2023-06-28 PROCEDURE — 4410556 CT-CARDIAC SCORING (SELF PAY ONLY)

## 2023-07-03 ENCOUNTER — DOCUMENTATION (OUTPATIENT)
Dept: HEALTH INFORMATION MANAGEMENT | Facility: OTHER | Age: 73
End: 2023-07-03
Payer: MEDICARE

## 2023-09-06 ENCOUNTER — APPOINTMENT (OUTPATIENT)
Dept: MEDICAL GROUP | Facility: PHYSICIAN GROUP | Age: 73
End: 2023-09-06
Payer: MEDICARE

## 2023-09-18 ENCOUNTER — APPOINTMENT (OUTPATIENT)
Dept: RADIOLOGY | Facility: MEDICAL CENTER | Age: 73
End: 2023-09-18
Attending: INTERNAL MEDICINE
Payer: MEDICARE

## 2023-09-18 DIAGNOSIS — Z12.31 VISIT FOR SCREENING MAMMOGRAM: ICD-10-CM

## 2023-09-18 PROCEDURE — 77063 BREAST TOMOSYNTHESIS BI: CPT

## 2023-10-02 PROCEDURE — RXMED WILLOW AMBULATORY MEDICATION CHARGE: Performed by: INTERNAL MEDICINE

## 2023-10-03 ENCOUNTER — PHARMACY VISIT (OUTPATIENT)
Dept: PHARMACY | Facility: MEDICAL CENTER | Age: 73
End: 2023-10-03
Payer: COMMERCIAL

## 2023-10-03 RX ORDER — INFLUENZA A VIRUS A/MICHIGAN/45/2015 X-275 (H1N1) ANTIGEN (FORMALDEHYDE INACTIVATED), INFLUENZA A VIRUS A/SINGAPORE/INFIMH-16-0019/2016 IVR-186 (H3N2) ANTIGEN (FORMALDEHYDE INACTIVATED), INFLUENZA B VIRUS B/PHUKET/3073/2013 ANTIGEN (FORMALDEHYDE INACTIVATED), AND INFLUENZA B VIRUS B/MARYLAND/15/2016 BX-69A ANTIGEN (FORMALDEHYDE INACTIVATED) 60; 60; 60; 60 UG/.7ML; UG/.7ML; UG/.7ML; UG/.7ML
INJECTION, SUSPENSION INTRAMUSCULAR
Qty: 0.7 ML | Refills: 0 | OUTPATIENT
Start: 2023-10-02

## 2023-12-27 ENCOUNTER — TELEPHONE (OUTPATIENT)
Dept: MEDICAL GROUP | Facility: PHYSICIAN GROUP | Age: 73
End: 2023-12-27
Payer: MEDICARE

## 2023-12-28 ENCOUNTER — TELEPHONE (OUTPATIENT)
Dept: HEALTH INFORMATION MANAGEMENT | Facility: OTHER | Age: 73
End: 2023-12-28
Payer: MEDICARE

## 2023-12-28 NOTE — TELEPHONE ENCOUNTER
Caller Name: [phone] (home)    Call Back Number: [phone] (home)      How would the patient prefer to be contacted with a response: MyChart message    Patient needs prescription for Paxlovid. A rapid test was performed and came out positive for Covid.

## 2024-05-30 ENCOUNTER — APPOINTMENT (RX ONLY)
Dept: URBAN - METROPOLITAN AREA CLINIC 15 | Facility: CLINIC | Age: 74
Setting detail: DERMATOLOGY
End: 2024-05-30

## 2024-05-30 DIAGNOSIS — D18.0 HEMANGIOMA: ICD-10-CM

## 2024-05-30 DIAGNOSIS — D485 NEOPLASM OF UNCERTAIN BEHAVIOR OF SKIN: ICD-10-CM

## 2024-05-30 DIAGNOSIS — L81.4 OTHER MELANIN HYPERPIGMENTATION: ICD-10-CM

## 2024-05-30 DIAGNOSIS — D22 MELANOCYTIC NEVI: ICD-10-CM

## 2024-05-30 DIAGNOSIS — L82.1 OTHER SEBORRHEIC KERATOSIS: ICD-10-CM

## 2024-05-30 DIAGNOSIS — Z71.89 OTHER SPECIFIED COUNSELING: ICD-10-CM

## 2024-05-30 PROBLEM — D48.5 NEOPLASM OF UNCERTAIN BEHAVIOR OF SKIN: Status: ACTIVE | Noted: 2024-05-30

## 2024-05-30 PROBLEM — D22.62 MELANOCYTIC NEVI OF LEFT UPPER LIMB, INCLUDING SHOULDER: Status: ACTIVE | Noted: 2024-05-30

## 2024-05-30 PROBLEM — D22.5 MELANOCYTIC NEVI OF TRUNK: Status: ACTIVE | Noted: 2024-05-30

## 2024-05-30 PROBLEM — D18.01 HEMANGIOMA OF SKIN AND SUBCUTANEOUS TISSUE: Status: ACTIVE | Noted: 2024-05-30

## 2024-05-30 PROBLEM — D22.61 MELANOCYTIC NEVI OF RIGHT UPPER LIMB, INCLUDING SHOULDER: Status: ACTIVE | Noted: 2024-05-30

## 2024-05-30 PROBLEM — D22.71 MELANOCYTIC NEVI OF RIGHT LOWER LIMB, INCLUDING HIP: Status: ACTIVE | Noted: 2024-05-30

## 2024-05-30 PROBLEM — D22.72 MELANOCYTIC NEVI OF LEFT LOWER LIMB, INCLUDING HIP: Status: ACTIVE | Noted: 2024-05-30

## 2024-05-30 PROCEDURE — 11102 TANGNTL BX SKIN SINGLE LES: CPT

## 2024-05-30 PROCEDURE — ? COUNSELING

## 2024-05-30 PROCEDURE — 99213 OFFICE O/P EST LOW 20 MIN: CPT | Mod: 25

## 2024-05-30 PROCEDURE — ? BIOPSY BY SHAVE METHOD

## 2024-05-30 ASSESSMENT — LOCATION SIMPLE DESCRIPTION DERM
LOCATION SIMPLE: LEFT UPPER BACK
LOCATION SIMPLE: RIGHT THIGH
LOCATION SIMPLE: LEFT POSTERIOR THIGH
LOCATION SIMPLE: LEFT CHEEK
LOCATION SIMPLE: LEFT THIGH
LOCATION SIMPLE: LEFT FOREARM
LOCATION SIMPLE: RIGHT UPPER ARM
LOCATION SIMPLE: LEFT UPPER ARM
LOCATION SIMPLE: RIGHT LOWER BACK
LOCATION SIMPLE: RIGHT POSTERIOR THIGH
LOCATION SIMPLE: LEFT CALF
LOCATION SIMPLE: CHEST
LOCATION SIMPLE: RIGHT FOREARM
LOCATION SIMPLE: RIGHT UPPER BACK
LOCATION SIMPLE: RIGHT CALF

## 2024-05-30 ASSESSMENT — LOCATION DETAILED DESCRIPTION DERM
LOCATION DETAILED: LEFT INFERIOR UPPER BACK
LOCATION DETAILED: LEFT DISTAL POSTERIOR THIGH
LOCATION DETAILED: RIGHT ANTERIOR DISTAL UPPER ARM
LOCATION DETAILED: LEFT CENTRAL MALAR CHEEK
LOCATION DETAILED: RIGHT MEDIAL INFERIOR CHEST
LOCATION DETAILED: RIGHT SUPERIOR LATERAL MIDBACK
LOCATION DETAILED: LEFT PROXIMAL DORSAL FOREARM
LOCATION DETAILED: LEFT PROXIMAL CALF
LOCATION DETAILED: LEFT INFERIOR MEDIAL UPPER BACK
LOCATION DETAILED: RIGHT VENTRAL PROXIMAL FOREARM
LOCATION DETAILED: RIGHT ANTERIOR PROXIMAL THIGH
LOCATION DETAILED: LEFT ANTERIOR DISTAL UPPER ARM
LOCATION DETAILED: RIGHT PROXIMAL CALF
LOCATION DETAILED: RIGHT PROXIMAL DORSAL FOREARM
LOCATION DETAILED: RIGHT MID-UPPER BACK
LOCATION DETAILED: LEFT ANTERIOR PROXIMAL THIGH
LOCATION DETAILED: LEFT VENTRAL DISTAL FOREARM
LOCATION DETAILED: RIGHT DISTAL POSTERIOR THIGH
LOCATION DETAILED: RIGHT VENTRAL DISTAL FOREARM

## 2024-05-30 ASSESSMENT — LOCATION ZONE DERM
LOCATION ZONE: FACE
LOCATION ZONE: LEG
LOCATION ZONE: TRUNK
LOCATION ZONE: ARM

## 2025-07-01 ENCOUNTER — APPOINTMENT (OUTPATIENT)
Dept: URBAN - METROPOLITAN AREA CLINIC 4 | Facility: CLINIC | Age: 75
Setting detail: DERMATOLOGY
End: 2025-07-01

## 2025-07-01 DIAGNOSIS — Z71.89 OTHER SPECIFIED COUNSELING: ICD-10-CM

## 2025-07-01 DIAGNOSIS — L82.1 OTHER SEBORRHEIC KERATOSIS: ICD-10-CM

## 2025-07-01 DIAGNOSIS — D18.0 HEMANGIOMA: ICD-10-CM

## 2025-07-01 DIAGNOSIS — D22 MELANOCYTIC NEVI: ICD-10-CM

## 2025-07-01 DIAGNOSIS — L81.4 OTHER MELANIN HYPERPIGMENTATION: ICD-10-CM

## 2025-07-01 PROBLEM — D22.62 MELANOCYTIC NEVI OF LEFT UPPER LIMB, INCLUDING SHOULDER: Status: ACTIVE | Noted: 2025-07-01

## 2025-07-01 PROBLEM — D18.01 HEMANGIOMA OF SKIN AND SUBCUTANEOUS TISSUE: Status: ACTIVE | Noted: 2025-07-01

## 2025-07-01 PROBLEM — D22.5 MELANOCYTIC NEVI OF TRUNK: Status: ACTIVE | Noted: 2025-07-01

## 2025-07-01 PROBLEM — D22.72 MELANOCYTIC NEVI OF LEFT LOWER LIMB, INCLUDING HIP: Status: ACTIVE | Noted: 2025-07-01

## 2025-07-01 PROBLEM — D22.61 MELANOCYTIC NEVI OF RIGHT UPPER LIMB, INCLUDING SHOULDER: Status: ACTIVE | Noted: 2025-07-01

## 2025-07-01 PROBLEM — D48.5 NEOPLASM OF UNCERTAIN BEHAVIOR OF SKIN: Status: ACTIVE | Noted: 2025-07-01

## 2025-07-01 PROBLEM — D22.71 MELANOCYTIC NEVI OF RIGHT LOWER LIMB, INCLUDING HIP: Status: ACTIVE | Noted: 2025-07-01

## 2025-07-01 PROCEDURE — ? OBSERVATION

## 2025-07-01 PROCEDURE — ? COUNSELING

## 2025-07-01 PROCEDURE — ? BIOPSY BY SHAVE METHOD

## 2025-07-01 ASSESSMENT — LOCATION ZONE DERM
LOCATION ZONE: ARM
LOCATION ZONE: LIP
LOCATION ZONE: FACE
LOCATION ZONE: LEG
LOCATION ZONE: TRUNK
LOCATION ZONE: LIP

## 2025-07-01 ASSESSMENT — LOCATION DETAILED DESCRIPTION DERM
LOCATION DETAILED: LEFT ANTERIOR PROXIMAL THIGH
LOCATION DETAILED: LEFT INFERIOR UPPER BACK
LOCATION DETAILED: RIGHT UPPER CUTANEOUS LIP
LOCATION DETAILED: LEFT PROXIMAL DORSAL FOREARM
LOCATION DETAILED: RIGHT PROXIMAL CALF
LOCATION DETAILED: LEFT ANTERIOR DISTAL UPPER ARM
LOCATION DETAILED: LEFT DISTAL POSTERIOR THIGH
LOCATION DETAILED: RIGHT VENTRAL DISTAL FOREARM
LOCATION DETAILED: RIGHT PROXIMAL DORSAL FOREARM
LOCATION DETAILED: RIGHT SUPERIOR LATERAL MIDBACK
LOCATION DETAILED: LEFT CENTRAL MALAR CHEEK
LOCATION DETAILED: RIGHT VENTRAL PROXIMAL FOREARM
LOCATION DETAILED: RIGHT UPPER CUTANEOUS LIP
LOCATION DETAILED: RIGHT ANTERIOR DISTAL UPPER ARM
LOCATION DETAILED: LEFT PROXIMAL CALF
LOCATION DETAILED: LEFT VENTRAL DISTAL FOREARM
LOCATION DETAILED: RIGHT DISTAL POSTERIOR THIGH
LOCATION DETAILED: RIGHT MEDIAL INFERIOR CHEST
LOCATION DETAILED: RIGHT MID-UPPER BACK
LOCATION DETAILED: RIGHT ANTERIOR PROXIMAL THIGH

## 2025-07-01 ASSESSMENT — LOCATION SIMPLE DESCRIPTION DERM
LOCATION SIMPLE: LEFT CALF
LOCATION SIMPLE: LEFT CHEEK
LOCATION SIMPLE: RIGHT LIP
LOCATION SIMPLE: RIGHT CALF
LOCATION SIMPLE: RIGHT UPPER ARM
LOCATION SIMPLE: LEFT UPPER BACK
LOCATION SIMPLE: RIGHT FOREARM
LOCATION SIMPLE: RIGHT THIGH
LOCATION SIMPLE: LEFT UPPER ARM
LOCATION SIMPLE: RIGHT LIP
LOCATION SIMPLE: RIGHT UPPER BACK
LOCATION SIMPLE: LEFT FOREARM
LOCATION SIMPLE: LEFT THIGH
LOCATION SIMPLE: RIGHT LOWER BACK
LOCATION SIMPLE: CHEST
LOCATION SIMPLE: RIGHT POSTERIOR THIGH
LOCATION SIMPLE: LEFT POSTERIOR THIGH